# Patient Record
Sex: FEMALE | Race: WHITE | NOT HISPANIC OR LATINO | Employment: OTHER | URBAN - METROPOLITAN AREA
[De-identification: names, ages, dates, MRNs, and addresses within clinical notes are randomized per-mention and may not be internally consistent; named-entity substitution may affect disease eponyms.]

---

## 2023-09-14 PROBLEM — J44.9 CHRONIC OBSTRUCTIVE LUNG DISEASE (MULTI): Status: ACTIVE | Noted: 2023-09-14

## 2023-09-14 PROBLEM — S62.109A CLOSED FRACTURE OF CARPAL BONE: Status: ACTIVE | Noted: 2023-09-14

## 2023-09-14 PROBLEM — R53.1 ATTACKS OF WEAKNESS: Status: ACTIVE | Noted: 2023-09-14

## 2023-09-14 PROBLEM — E66.3 OVERWEIGHT: Status: ACTIVE | Noted: 2019-07-24

## 2023-09-14 PROBLEM — M17.9 KNEE OSTEOARTHRITIS: Status: ACTIVE | Noted: 2023-09-14

## 2023-09-14 PROBLEM — Z98.890 HISTORY OF MITRAL VALVE REPAIR: Status: ACTIVE | Noted: 2023-09-14

## 2023-09-14 PROBLEM — I34.0 MITRAL REGURGITATION: Status: ACTIVE | Noted: 2023-09-14

## 2023-09-14 PROBLEM — Z96.659 ARTIFICIAL KNEE JOINT PRESENT: Status: ACTIVE | Noted: 2023-09-14

## 2023-09-14 PROBLEM — R05.3 CHRONIC COUGH: Status: ACTIVE | Noted: 2023-09-14

## 2023-09-14 PROBLEM — D49.2 ABNORMAL SKIN GROWTH: Status: ACTIVE | Noted: 2019-07-16

## 2023-09-14 PROBLEM — R22.9 SKIN MASS: Status: ACTIVE | Noted: 2023-09-14

## 2023-09-14 PROBLEM — M17.12 OSTEOARTHRITIS OF LEFT KNEE: Status: ACTIVE | Noted: 2023-09-14

## 2023-09-14 PROBLEM — D69.2 SENILE PURPURA (CMS-HCC): Status: ACTIVE | Noted: 2023-09-14

## 2023-09-14 PROBLEM — S80.212A ABRASION, LEFT KNEE, INITIAL ENCOUNTER: Status: ACTIVE | Noted: 2023-09-14

## 2023-09-14 PROBLEM — Z77.29 LONG-TERM EXPOSURE INVOLVING BIRD DROPPINGS: Status: ACTIVE | Noted: 2023-09-14

## 2023-09-14 PROBLEM — F32.A DEPRESSION: Status: ACTIVE | Noted: 2023-09-14

## 2023-09-14 PROBLEM — Z96.652 H/O TOTAL KNEE REPLACEMENT, LEFT: Status: ACTIVE | Noted: 2023-09-14

## 2023-09-14 PROBLEM — Z98.890 HISTORY OF KNEE SURGERY: Status: ACTIVE | Noted: 2023-09-14

## 2023-09-14 PROBLEM — R00.1 BRADYCARDIA: Status: ACTIVE | Noted: 2023-09-14

## 2023-09-14 PROBLEM — E78.00 HYPERCHOLESTEROLEMIA: Status: ACTIVE | Noted: 2023-09-14

## 2023-09-14 PROBLEM — M25.362 INSTABILITY OF LEFT KNEE JOINT: Status: ACTIVE | Noted: 2023-09-14

## 2023-09-14 PROBLEM — I48.91 RAPID ATRIAL FIBRILLATION (MULTI): Status: ACTIVE | Noted: 2023-09-14

## 2023-09-14 PROBLEM — D64.9 ANEMIA: Status: ACTIVE | Noted: 2023-09-14

## 2023-09-14 PROBLEM — F41.9 ANXIETY DISORDER: Status: ACTIVE | Noted: 2023-09-14

## 2023-09-14 PROBLEM — T78.40XA HYPERSENSITIVITY REACTION: Status: ACTIVE | Noted: 2023-09-14

## 2023-09-14 PROBLEM — S63.005A: Status: ACTIVE | Noted: 2023-09-14

## 2023-09-14 PROBLEM — R09.02 HYPOXEMIA: Status: ACTIVE | Noted: 2023-09-14

## 2023-09-14 PROBLEM — I10 HYPERTENSION: Status: ACTIVE | Noted: 2023-09-14

## 2023-09-14 PROBLEM — I50.9 CONGESTIVE HEART FAILURE (MULTI): Status: ACTIVE | Noted: 2019-08-16

## 2023-09-14 PROBLEM — R09.02 HYPOXIA: Status: ACTIVE | Noted: 2023-09-14

## 2023-09-14 PROBLEM — J96.11 CHRONIC RESPIRATORY FAILURE WITH HYPOXIA (MULTI): Status: ACTIVE | Noted: 2023-09-14

## 2023-09-14 PROBLEM — R93.89 ABNORMAL CAT SCAN: Status: ACTIVE | Noted: 2023-09-14

## 2023-09-14 PROBLEM — R53.83 FATIGUE: Status: ACTIVE | Noted: 2023-09-14

## 2023-09-14 PROBLEM — I10 BENIGN ESSENTIAL HYPERTENSION: Status: ACTIVE | Noted: 2023-09-14

## 2023-09-14 PROBLEM — D75.89 MACROCYTOSIS WITHOUT ANEMIA: Status: ACTIVE | Noted: 2018-10-30

## 2023-09-14 PROBLEM — N18.30 STAGE 3 CHRONIC KIDNEY DISEASE (MULTI): Status: ACTIVE | Noted: 2023-09-14

## 2023-09-14 PROBLEM — R26.2 INABILITY TO WALK: Status: ACTIVE | Noted: 2023-09-14

## 2023-09-14 PROBLEM — R06.02 SOB (SHORTNESS OF BREATH): Status: ACTIVE | Noted: 2023-09-14

## 2023-09-14 PROBLEM — S63.90XA HAND SPRAIN: Status: ACTIVE | Noted: 2023-09-14

## 2023-09-14 PROBLEM — J84.9 ILD (INTERSTITIAL LUNG DISEASE) (MULTI): Status: ACTIVE | Noted: 2023-09-14

## 2023-09-14 PROBLEM — J90 BILATERAL PLEURAL EFFUSION: Status: ACTIVE | Noted: 2023-09-14

## 2023-09-14 PROBLEM — J14: Status: ACTIVE | Noted: 2023-09-14

## 2023-09-14 PROBLEM — Z98.818 OTHER DENTAL PROCEDURE STATUS: Status: ACTIVE | Noted: 2023-09-14

## 2023-09-14 RX ORDER — DIGOXIN 125 MCG
1 TABLET ORAL 3 TIMES WEEKLY
COMMUNITY
Start: 2021-07-29

## 2023-09-14 RX ORDER — CELECOXIB 200 MG/1
200 CAPSULE ORAL DAILY
COMMUNITY
Start: 2019-09-16 | End: 2023-10-31 | Stop reason: ALTCHOICE

## 2023-09-14 RX ORDER — FUROSEMIDE 20 MG/1
20 TABLET ORAL DAILY
COMMUNITY
Start: 2023-01-04

## 2023-09-14 RX ORDER — WARFARIN SODIUM 5 MG/1
1 TABLET ORAL DAILY
COMMUNITY
Start: 2019-10-21 | End: 2023-10-31 | Stop reason: ALTCHOICE

## 2023-09-14 RX ORDER — FUROSEMIDE 40 MG/1
1 TABLET ORAL DAILY
COMMUNITY
Start: 2015-03-26 | End: 2023-10-31 | Stop reason: ALTCHOICE

## 2023-09-14 RX ORDER — HYDROXYZINE PAMOATE 25 MG/1
25 CAPSULE ORAL 3 TIMES DAILY PRN
COMMUNITY
Start: 2021-05-11 | End: 2023-10-31 | Stop reason: ALTCHOICE

## 2023-09-14 RX ORDER — DIGOXIN 250 MCG
250 TABLET ORAL DAILY
COMMUNITY
End: 2023-10-31 | Stop reason: ALTCHOICE

## 2023-09-14 RX ORDER — METOPROLOL SUCCINATE 50 MG/1
1.5 TABLET, EXTENDED RELEASE ORAL DAILY
COMMUNITY
Start: 2019-10-17 | End: 2023-11-21

## 2023-09-14 RX ORDER — FLUOXETINE HYDROCHLORIDE 20 MG/1
20 CAPSULE ORAL EVERY MORNING
COMMUNITY
End: 2024-01-24

## 2023-09-14 RX ORDER — SPIRONOLACTONE 25 MG/1
TABLET ORAL
COMMUNITY
Start: 2023-01-04

## 2023-09-14 RX ORDER — ACETAMINOPHEN 325 MG/1
325 TABLET ORAL EVERY 4 HOURS PRN
COMMUNITY

## 2023-09-14 RX ORDER — BUPROPION HYDROCHLORIDE 75 MG/1
1 TABLET ORAL DAILY
COMMUNITY
Start: 2014-04-09

## 2023-09-14 RX ORDER — FLUOXETINE HYDROCHLORIDE 40 MG/1
1 CAPSULE ORAL DAILY
COMMUNITY
Start: 2014-09-30 | End: 2023-10-31 | Stop reason: ALTCHOICE

## 2023-09-14 RX ORDER — ATORVASTATIN CALCIUM 20 MG/1
20 TABLET, FILM COATED ORAL DAILY
COMMUNITY
End: 2024-02-09 | Stop reason: SDUPTHER

## 2023-09-14 RX ORDER — WARFARIN 2.5 MG/1
2.5 TABLET ORAL
COMMUNITY
Start: 2023-01-04 | End: 2023-10-31 | Stop reason: ALTCHOICE

## 2023-10-04 ENCOUNTER — OFFICE VISIT (OUTPATIENT)
Dept: CARDIOLOGY | Facility: CLINIC | Age: 83
End: 2023-10-04
Payer: MEDICARE

## 2023-10-04 VITALS
HEART RATE: 105 BPM | DIASTOLIC BLOOD PRESSURE: 70 MMHG | SYSTOLIC BLOOD PRESSURE: 123 MMHG | OXYGEN SATURATION: 94 % | BODY MASS INDEX: 24.29 KG/M2 | HEIGHT: 62 IN | WEIGHT: 132 LBS

## 2023-10-04 DIAGNOSIS — I48.11 LONGSTANDING PERSISTENT ATRIAL FIBRILLATION (MULTI): Primary | ICD-10-CM

## 2023-10-04 PROCEDURE — 3078F DIAST BP <80 MM HG: CPT | Performed by: INTERNAL MEDICINE

## 2023-10-04 PROCEDURE — 1036F TOBACCO NON-USER: CPT | Performed by: INTERNAL MEDICINE

## 2023-10-04 PROCEDURE — 99204 OFFICE O/P NEW MOD 45 MIN: CPT | Performed by: INTERNAL MEDICINE

## 2023-10-04 PROCEDURE — 3074F SYST BP LT 130 MM HG: CPT | Performed by: INTERNAL MEDICINE

## 2023-10-04 PROCEDURE — 1159F MED LIST DOCD IN RCRD: CPT | Performed by: INTERNAL MEDICINE

## 2023-10-04 PROCEDURE — 99214 OFFICE O/P EST MOD 30 MIN: CPT | Performed by: INTERNAL MEDICINE

## 2023-10-04 ASSESSMENT — PATIENT HEALTH QUESTIONNAIRE - PHQ9
2. FEELING DOWN, DEPRESSED OR HOPELESS: NOT AT ALL
1. LITTLE INTEREST OR PLEASURE IN DOING THINGS: NOT AT ALL
SUM OF ALL RESPONSES TO PHQ9 QUESTIONS 1 AND 2: 0

## 2023-10-04 NOTE — LETTER
October 5, 2023     Andrés Serna MD  [address] [facility] [address]  [facility] [address]    Patient: Vilma Bernabe   YOB: 1940   Date of Visit: 10/4/2023       Dear Dr. Andrés Serna MD:    Thank you for referring Vilma Bernabe to me for evaluation. Below are my notes for this consultation.  If you have questions, please do not hesitate to call me. I look forward to following your patient along with you.       Sincerely,     Dilan Holt MD      CC: No Recipients  ______________________________________________________________________________________      Cardio: Dr. Serna    I was asked by Dr. Serna to evaluate this patient in consultation for evaluation of left atrial appendage closure.    The patient is a 82-year-old lady with past medical history of hypertension, heart failure (EF 40%), hypertension, history of mitral valve repair/tricuspid valve repair along with maze procedure 2015.      The patient has persistent atrial fibrillation.  She have previously had made procedure in 2015, it is unclear if she had left atrial appendage clip performed at that time.  The patient has been on anticoagulation with warfarin.    In 2021 patient had an episode in which she fainted and had a fall leading to significant facial trauma requiring multiple stitches.  Fortunately she did not have any intracranial bleeding.  After discussing the risk and benefit, final decision was made to resume her warfarin.  In addition she also has labile INR, which concerns her and her primary cardiology team regarding either risk of increased bleeding or ineffective stroke prevention.  The patient also reports easy bruisability.      Given the patient's significant fall with facial trauma, labile INR and easy bruisability,  the patient is referred for consideration of left atrial appendage closure for stroke risk reduction.       ROS:  Constitutional: not feeling tired, not feeling poorly, no fever and  no chills.   Eyes: no eyesight problems, no blurred vision, no diplopia, no eye pain, no purulent discharge from the eyes, eyes not red, no dryness of the eyes and no itching of the eyes.   ENT: no nosebleeds, no hearing loss, no tinnitus, no earache, no sore throat, no hoarseness, no swollen glands in the neck and no nasal discharge.   Cardiovascular: no intermittent leg claudication, no chest pain, no tightness or heavy pressure, no shortness of breath, no palpitations, no lower extremity edema, the heart rate was not slow, the heart rate was not fast and as noted in HPI.   Respiratory: no chronic cough, not coughing up sputum,  no wheezing that is consistent with asthma, no asthma, no orthopnea and no postural nocturnal dyspnea.   Gastrointestinal: no change in bowel habits, no blood in stools, no diarrhea, no constipation, no nausea, no vomiting, no abdominal pain, no signs and symptoms of ulcer disease, no frandy colored stools and no intolerance to fatty foods.   Genitourinary: no hematuria,  no urinary frequency, no dysuria, no incontinence, no burning sensation during urination, no urinary hesitancy, no nocturia, no genital lesion, no testicular pain, urinary stream is not smaller and urinary stream does not start and stop.   Musculoskeletal: no arthralgias, no myalgias, no joint swelling, no joint stiffness, no muscle weakness, no back pain, no limb pain, no limb swelling and no difficulty walking.   Skin: no skin rashes, no change in skin color and pigmentation, no skin lesions and no skin lumps.   Neurological: no seizures, no frequent falls, no headaches, no dizziness, no tingling, no fainting and no limb weakness.   Psychiatric: no depression, not suicidal, no confusion, no memory lapses or loss, no anxiety, no personality change and no emotional problems.   Endocrine: no goiter, no thyroid disorder, no diabetes mellitus, no excessive thirst, no dry skin, no cold intolerance, no heat intolerance, no  "erectile dysfunction, no increased urinary frequency, no proptosis and no deepening of the voice.   Hematologic/Lymphatic: no bleeding issues.   All other systems have been reviewed and are negative for complaint.     Physical Exam:     Visit Vitals  /70 (BP Location: Left arm, Patient Position: Sitting, BP Cuff Size: Adult)   Pulse 105   Ht 1.575 m (5' 2\")   Wt 59.9 kg (132 lb)   SpO2 94%   BMI 24.14 kg/m²   Smoking Status Never   BSA 1.62 m²        Constitutional: alert and in no acute distress.   Eyes: no erythema, swelling or discharge from the eye .   Ears, Nose, Mouth, and Throat: external inspection of ears and nose is normal , lips, teeth, and gums are normal with good dentition  and oropharynx normal with no erythema, edema, exudate or lesions .   Neck: neck is supple, symmetric, trachea midline, no masses  and no thyromegaly .   Pulmonary: no increased work of breathing or signs of respiratory distress , lungs clear to auscultation. , normal percussion of chest  and chest palpation normal .   Cardiovascular: RRR, no murmur,  no leg edema  Abdomen: abdomen non-tender, no masses  and no hepatomegaly .           Skin:  no skin lesions          Neurologic: non-focal neurologic examination.      Psychiatric judgment and insight is normal , oriented to person, place and time , normal mood and affect .       Labs:    Results for orders placed or performed in visit on 08/29/23    -  PT/INR (Data Conversion)   Result Value Ref Range     - Protime Result (Data Conversion) 18.0 8.9 - 12.1 sec     - Lot # (Data Conversion) [id number]      - INR Result (Data Conversion) 1.5 0.86 - 1.16    LH - Time of Test (Data Conversion) 0232pm      - Initials (Data Conversion) dmw     LH - Expiration Date (Data Conversion) 48728005     LH - Date/Time Physician notified (Data Conversion) [id number]      - Received Date (Data Conversion) [id number]           Medications:    Current Outpatient Medications "   Medication Instructions   • acetaminophen (TYLENOL) 325 mg, oral, Every 4 hours PRN   • apixaban (ELIQUIS) 2.5 mg, oral, 2 times daily   • atorvastatin (LIPITOR) 20 mg, oral, Daily   • buPROPion (Wellbutrin) 75 mg tablet 1 tablet, oral, Daily   • celecoxib (CELEBREX) 200 mg, oral, Daily   • digoxin (Lanoxin) 125 MCG tablet 1 tablet, oral, Daily   • digoxin (LANOXIN) 250 mcg, oral, Daily   • empagliflozin (JARDIANCE) 10 mg, oral, Daily   • FLUoxetine (PROzac) 40 mg capsule 1 capsule, oral, Daily   • FLUoxetine (PROZAC) 20 mg, oral, Every morning   • furosemide (Lasix) 40 mg tablet 1 tablet, oral, Daily   • furosemide (LASIX) 20 mg, oral, Daily   • hydrOXYzine pamoate (VISTARIL) 25 mg, oral, 3 times daily PRN   • metoprolol succinate XL (Toprol-XL) 50 mg 24 hr tablet 1 tablet, oral, Daily   • multivitamin with minerals (HAIR,SKIN AND NAILS ORAL) as directed Orally Daily   • spironolactone (Aldactone) 25 mg tablet  1/2 tablet Orally    • warfarin (Coumadin) 5 mg tablet 1 tablet, oral, Daily   • warfarin (COUMADIN) 2.5 mg, oral, Once a day          Assessment:      This is a 82-year-old lady with history of heart failure (EF 40%), mitral valve repair, tricuspid valve repair, persistent atrial fibrillation with history of maze in 2015.  The patient comes to our clinic because of history of a significant fall leading to facial trauma, labile INR and easy bruisability.  We agree with her primary cardiology team that considering her significant fall leading to facial trauma and ineffective protection from anticoagulation/risk of bleeding because of variability in her INR; she is not a good candidate for long-term anticoagulation.      The CHADS-VASC score is 4 and HAS-BLED score is 3. The patient is at increased risk of both bleeding and stroke.  As such the patient is a reasonable candidate for consideration of left atrial appendage occluder placement.    She had mitral/tricuspid valve repair and maze procedure performed  in 2015 at [facility].  We do not have that report available.  It is possible that during the procedure left atrial appendage was ligated.  In order to further investigate that we will try to obtain that report, in addition we will get a CT scan to look for possible left atrial appendage ligation and also for any residual left atrial appendage left behind.  Any further decision to proceed with left atrial appendage closure will depend upon that information.    Today we discussed the left atrial appendage closure procedure. The patient was given written educational handout materials and watched an educational video. All risks, benefits and alternative were discussed.     The risks discussed included but were not limited to vascular complications, sedation related complications, risk of MI, CVA, device embolization, pericardial tamponade and death. The patient verbalized understanding and decided to proceed.        Plan will be for preprocedural cardiac CT. Following device implant, strategy will be for dual antiplatelet therapy with aspirin and clopidogrel for 6 months then aspirin for life.     Thank you, Dr. Serna, for this consultation and for allowing me to participate in the care of this patient.

## 2023-10-04 NOTE — PROGRESS NOTES
Cardio: Dr. Kareem PATTON was asked by Dr. Serna to evaluate this patient in consultation for evaluation of left atrial appendage closure.    The patient is a 82-year-old lady with past medical history of hypertension, heart failure (EF 40%), hypertension, history of mitral valve repair/tricuspid valve repair along with maze procedure 2015.      The patient has persistent atrial fibrillation.  She have previously had made procedure in 2015, it is unclear if she had left atrial appendage clip performed at that time.  The patient has been on anticoagulation with warfarin.    In 2021 patient had an episode in which she fainted and had a fall leading to significant facial trauma requiring multiple stitches.  Fortunately she did not have any intracranial bleeding.  After discussing the risk and benefit, final decision was made to resume her warfarin.  In addition she also has labile INR, which concerns her and her primary cardiology team regarding either risk of increased bleeding or ineffective stroke prevention.  The patient also reports easy bruisability.      Given the patient's significant fall with facial trauma, labile INR and easy bruisability,  the patient is referred for consideration of left atrial appendage closure for stroke risk reduction.       ROS:  Constitutional: not feeling tired, not feeling poorly, no fever and no chills.   Eyes: no eyesight problems, no blurred vision, no diplopia, no eye pain, no purulent discharge from the eyes, eyes not red, no dryness of the eyes and no itching of the eyes.   ENT: no nosebleeds, no hearing loss, no tinnitus, no earache, no sore throat, no hoarseness, no swollen glands in the neck and no nasal discharge.   Cardiovascular: no intermittent leg claudication, no chest pain, no tightness or heavy pressure, no shortness of breath, no palpitations, no lower extremity edema, the heart rate was not slow, the heart rate was not fast and as noted in HPI.   Respiratory:  "no chronic cough, not coughing up sputum,  no wheezing that is consistent with asthma, no asthma, no orthopnea and no postural nocturnal dyspnea.   Gastrointestinal: no change in bowel habits, no blood in stools, no diarrhea, no constipation, no nausea, no vomiting, no abdominal pain, no signs and symptoms of ulcer disease, no frandy colored stools and no intolerance to fatty foods.   Genitourinary: no hematuria,  no urinary frequency, no dysuria, no incontinence, no burning sensation during urination, no urinary hesitancy, no nocturia, no genital lesion, no testicular pain, urinary stream is not smaller and urinary stream does not start and stop.   Musculoskeletal: no arthralgias, no myalgias, no joint swelling, no joint stiffness, no muscle weakness, no back pain, no limb pain, no limb swelling and no difficulty walking.   Skin: no skin rashes, no change in skin color and pigmentation, no skin lesions and no skin lumps.   Neurological: no seizures, no frequent falls, no headaches, no dizziness, no tingling, no fainting and no limb weakness.   Psychiatric: no depression, not suicidal, no confusion, no memory lapses or loss, no anxiety, no personality change and no emotional problems.   Endocrine: no goiter, no thyroid disorder, no diabetes mellitus, no excessive thirst, no dry skin, no cold intolerance, no heat intolerance, no erectile dysfunction, no increased urinary frequency, no proptosis and no deepening of the voice.   Hematologic/Lymphatic: no bleeding issues.   All other systems have been reviewed and are negative for complaint.     Physical Exam:     Visit Vitals  /70 (BP Location: Left arm, Patient Position: Sitting, BP Cuff Size: Adult)   Pulse 105   Ht 1.575 m (5' 2\")   Wt 59.9 kg (132 lb)   SpO2 94%   BMI 24.14 kg/m²   Smoking Status Never   BSA 1.62 m²        Constitutional: alert and in no acute distress.   Eyes: no erythema, swelling or discharge from the eye .   Ears, Nose, Mouth, and Throat: " external inspection of ears and nose is normal , lips, teeth, and gums are normal with good dentition  and oropharynx normal with no erythema, edema, exudate or lesions .   Neck: neck is supple, symmetric, trachea midline, no masses  and no thyromegaly .   Pulmonary: no increased work of breathing or signs of respiratory distress , lungs clear to auscultation. , normal percussion of chest  and chest palpation normal .   Cardiovascular: RRR, no murmur,  no leg edema  Abdomen: abdomen non-tender, no masses  and no hepatomegaly .           Skin:  no skin lesions          Neurologic: non-focal neurologic examination.      Psychiatric judgment and insight is normal , oriented to person, place and time , normal mood and affect .       Labs:    Results for orders placed or performed in visit on 08/29/23    -  PT/INR (Data Conversion)   Result Value Ref Range     - Protime Result (Data Conversion) 18.0 8.9 - 12.1 sec     - Lot # (Data Conversion) [id number]      - INR Result (Data Conversion) 1.5 0.86 - 1.16     - Time of Test (Data Conversion) 0232pm      - Initials (Data Conversion) dmw      - Expiration Date (Data Conversion) [id number]      - Date/Time Physician notified (Data Conversion) [id number]      - Received Date (Data Conversion) [id number]           Medications:    Current Outpatient Medications   Medication Instructions    acetaminophen (TYLENOL) 325 mg, oral, Every 4 hours PRN    apixaban (ELIQUIS) 2.5 mg, oral, 2 times daily    atorvastatin (LIPITOR) 20 mg, oral, Daily    buPROPion (Wellbutrin) 75 mg tablet 1 tablet, oral, Daily    celecoxib (CELEBREX) 200 mg, oral, Daily    digoxin (Lanoxin) 125 MCG tablet 1 tablet, oral, Daily    digoxin (LANOXIN) 250 mcg, oral, Daily    empagliflozin (JARDIANCE) 10 mg, oral, Daily    FLUoxetine (PROzac) 40 mg capsule 1 capsule, oral, Daily    FLUoxetine (PROZAC) 20 mg, oral, Every morning    furosemide (Lasix) 40 mg tablet 1 tablet, oral, Daily     furosemide (LASIX) 20 mg, oral, Daily    hydrOXYzine pamoate (VISTARIL) 25 mg, oral, 3 times daily PRN    metoprolol succinate XL (Toprol-XL) 50 mg 24 hr tablet 1 tablet, oral, Daily    multivitamin with minerals (HAIR,SKIN AND NAILS ORAL) as directed Orally Daily    spironolactone (Aldactone) 25 mg tablet  1/2 tablet Orally     warfarin (Coumadin) 5 mg tablet 1 tablet, oral, Daily    warfarin (COUMADIN) 2.5 mg, oral, Once a day          Assessment:      This is a 82-year-old lady with history of heart failure (EF 40%), mitral valve repair, tricuspid valve repair, persistent atrial fibrillation with history of maze in 2015.  The patient comes to our clinic because of history of a significant fall leading to facial trauma, labile INR and easy bruisability.  We agree with her primary cardiology team that considering her significant fall leading to facial trauma and ineffective protection from anticoagulation/risk of bleeding because of variability in her INR; she is not a good candidate for long-term anticoagulation.      The CHADS-VASC score is 4 and HAS-BLED score is 3. The patient is at increased risk of both bleeding and stroke.  As such the patient is a reasonable candidate for consideration of left atrial appendage occluder placement.    She had mitral/tricuspid valve repair and maze procedure performed in 2015 at [facility].  We do not have that report available.  It is possible that during the procedure left atrial appendage was ligated.  In order to further investigate that we will try to obtain that report, in addition we will get a CT scan to look for possible left atrial appendage ligation and also for any residual left atrial appendage left behind.  Any further decision to proceed with left atrial appendage closure will depend upon that information.    Today we discussed the left atrial appendage closure procedure. The patient was given written educational handout materials and watched an educational  video. All risks, benefits and alternative were discussed.     The risks discussed included but were not limited to vascular complications, sedation related complications, risk of MI, CVA, device embolization, pericardial tamponade and death. The patient verbalized understanding and decided to proceed.        Plan will be for preprocedural cardiac CT. Following device implant, strategy will be for dual antiplatelet therapy with aspirin and clopidogrel for 6 months then aspirin for life.     Thank you, Dr. Serna, for this consultation and for allowing me to participate in the care of this patient.

## 2023-10-05 ENCOUNTER — TELEPHONE (OUTPATIENT)
Dept: CARDIOLOGY | Facility: HOSPITAL | Age: 83
End: 2023-10-05
Payer: MEDICARE

## 2023-10-05 DIAGNOSIS — I48.91 ATRIAL FIBRILLATION, UNSPECIFIED TYPE (MULTI): ICD-10-CM

## 2023-10-31 ENCOUNTER — OFFICE VISIT (OUTPATIENT)
Dept: PRIMARY CARE | Facility: CLINIC | Age: 83
End: 2023-10-31
Payer: MEDICARE

## 2023-10-31 VITALS
HEART RATE: 78 BPM | BODY MASS INDEX: 24.29 KG/M2 | OXYGEN SATURATION: 97 % | HEIGHT: 62 IN | WEIGHT: 132 LBS | DIASTOLIC BLOOD PRESSURE: 80 MMHG | SYSTOLIC BLOOD PRESSURE: 124 MMHG

## 2023-10-31 DIAGNOSIS — I48.91 RAPID ATRIAL FIBRILLATION (MULTI): Primary | ICD-10-CM

## 2023-10-31 PROCEDURE — 99213 OFFICE O/P EST LOW 20 MIN: CPT | Performed by: FAMILY MEDICINE

## 2023-10-31 PROCEDURE — 3079F DIAST BP 80-89 MM HG: CPT | Performed by: FAMILY MEDICINE

## 2023-10-31 PROCEDURE — 1126F AMNT PAIN NOTED NONE PRSNT: CPT | Performed by: FAMILY MEDICINE

## 2023-10-31 PROCEDURE — 1159F MED LIST DOCD IN RCRD: CPT | Performed by: FAMILY MEDICINE

## 2023-10-31 PROCEDURE — 3074F SYST BP LT 130 MM HG: CPT | Performed by: FAMILY MEDICINE

## 2023-10-31 PROCEDURE — 1036F TOBACCO NON-USER: CPT | Performed by: FAMILY MEDICINE

## 2023-10-31 ASSESSMENT — PATIENT HEALTH QUESTIONNAIRE - PHQ9
SUM OF ALL RESPONSES TO PHQ9 QUESTIONS 1 AND 2: 0
2. FEELING DOWN, DEPRESSED OR HOPELESS: NOT AT ALL
1. LITTLE INTEREST OR PLEASURE IN DOING THINGS: NOT AT ALL

## 2023-10-31 ASSESSMENT — PAIN SCALES - GENERAL: PAINLEVEL: 0-NO PAIN

## 2023-10-31 ASSESSMENT — ENCOUNTER SYMPTOMS: BRUISES/BLEEDS EASILY: 0

## 2023-10-31 NOTE — PROGRESS NOTES
"Subjective   Patient ID: Vilma Bernabe is a 82 y.o. female who presents for inr.    Pt here for follow up.     Atrial fib- we switched to eliquis last visit per patient request due to inr variability.  She is undergoing evaluation for watchmans procedure.  Denies any bleeding or bruising.  No chest pain or SOB.           Review of Systems   Hematological:  Does not bruise/bleed easily.       Objective   Ht 1.575 m (5' 2\")   Wt 59.9 kg (132 lb)   BMI 24.14 kg/m²     Physical Exam  Constitutional:       Appearance: Normal appearance.   Cardiovascular:      Rate and Rhythm: Rhythm irregular.         Assessment/Plan   Diagnoses and all orders for this visit:  Rapid atrial fibrillation (CMS/HCC)  Continue with eliquis, agree with watchman.    Labs ordered today by cardiology.           "

## 2023-11-09 ENCOUNTER — TELEPHONE (OUTPATIENT)
Dept: CARDIOLOGY | Facility: HOSPITAL | Age: 83
End: 2023-11-09

## 2023-11-09 ENCOUNTER — LAB (OUTPATIENT)
Dept: LAB | Facility: LAB | Age: 83
End: 2023-11-09
Payer: MEDICARE

## 2023-11-09 DIAGNOSIS — I48.91 ATRIAL FIBRILLATION, UNSPECIFIED TYPE (MULTI): ICD-10-CM

## 2023-11-09 DIAGNOSIS — I10 ESSENTIAL (PRIMARY) HYPERTENSION: Primary | ICD-10-CM

## 2023-11-09 LAB
ANION GAP SERPL CALC-SCNC: 11 MMOL/L
BUN SERPL-MCNC: 17 MG/DL (ref 8–25)
CALCIUM SERPL-MCNC: 9.4 MG/DL (ref 8.5–10.4)
CHLORIDE SERPL-SCNC: 102 MMOL/L (ref 97–107)
CO2 SERPL-SCNC: 29 MMOL/L (ref 24–31)
CREAT SERPL-MCNC: 1.2 MG/DL (ref 0.4–1.6)
GFR SERPL CREATININE-BSD FRML MDRD: 45 ML/MIN/1.73M*2
GLUCOSE SERPL-MCNC: 98 MG/DL (ref 65–99)
MAGNESIUM SERPL-MCNC: 2.4 MG/DL (ref 1.6–3.1)
POTASSIUM SERPL-SCNC: 4.5 MMOL/L (ref 3.4–5.1)
SODIUM SERPL-SCNC: 142 MMOL/L (ref 133–145)

## 2023-11-09 PROCEDURE — 36415 COLL VENOUS BLD VENIPUNCTURE: CPT

## 2023-11-09 PROCEDURE — 83735 ASSAY OF MAGNESIUM: CPT

## 2023-11-09 PROCEDURE — 80048 BASIC METABOLIC PNL TOTAL CA: CPT

## 2023-11-10 ENCOUNTER — APPOINTMENT (OUTPATIENT)
Dept: RADIOLOGY | Facility: HOSPITAL | Age: 83
End: 2023-11-10
Payer: MEDICARE

## 2023-11-17 ENCOUNTER — TELEPHONE (OUTPATIENT)
Dept: CARDIOLOGY | Facility: HOSPITAL | Age: 83
End: 2023-11-17
Payer: MEDICARE

## 2023-11-20 DIAGNOSIS — I10 BENIGN ESSENTIAL HYPERTENSION: Primary | ICD-10-CM

## 2023-11-21 RX ORDER — METOPROLOL SUCCINATE 50 MG/1
50 TABLET, EXTENDED RELEASE ORAL DAILY
Qty: 90 TABLET | Refills: 3 | Status: SHIPPED | OUTPATIENT
Start: 2023-11-21

## 2023-11-24 ENCOUNTER — HOSPITAL ENCOUNTER (OUTPATIENT)
Dept: RADIOLOGY | Facility: HOSPITAL | Age: 83
Discharge: HOME | End: 2023-11-24
Payer: MEDICARE

## 2023-11-24 DIAGNOSIS — I48.91 ATRIAL FIBRILLATION, UNSPECIFIED TYPE (MULTI): ICD-10-CM

## 2023-11-24 PROCEDURE — 75572 CT HRT W/3D IMAGE: CPT

## 2023-11-24 PROCEDURE — 2550000001 HC RX 255 CONTRASTS: Performed by: INTERNAL MEDICINE

## 2023-11-24 RX ADMIN — IOHEXOL 75 ML: 350 INJECTION, SOLUTION INTRAVENOUS at 15:14

## 2023-11-30 ENCOUNTER — APPOINTMENT (OUTPATIENT)
Dept: ORTHOPEDIC SURGERY | Facility: CLINIC | Age: 83
End: 2023-11-30
Payer: MEDICARE

## 2023-12-06 ENCOUNTER — APPOINTMENT (OUTPATIENT)
Dept: ORTHOPEDIC SURGERY | Facility: CLINIC | Age: 83
End: 2023-12-06
Payer: MEDICARE

## 2023-12-29 ENCOUNTER — TELEPHONE (OUTPATIENT)
Dept: PRIMARY CARE | Facility: CLINIC | Age: 83
End: 2023-12-29
Payer: MEDICARE

## 2023-12-29 NOTE — TELEPHONE ENCOUNTER
Patient wants to know if she can make another appointment for you to remove small growths on her face?

## 2024-01-19 ENCOUNTER — APPOINTMENT (OUTPATIENT)
Dept: PRIMARY CARE | Facility: CLINIC | Age: 84
End: 2024-01-19
Payer: MEDICARE

## 2024-01-24 DIAGNOSIS — F41.9 ANXIETY DISORDER, UNSPECIFIED TYPE: Primary | ICD-10-CM

## 2024-01-24 RX ORDER — FLUOXETINE HYDROCHLORIDE 20 MG/1
20 CAPSULE ORAL
Qty: 90 CAPSULE | Refills: 3 | Status: SHIPPED | OUTPATIENT
Start: 2024-01-24

## 2024-01-25 ENCOUNTER — APPOINTMENT (OUTPATIENT)
Dept: PRIMARY CARE | Facility: CLINIC | Age: 84
End: 2024-01-25
Payer: MEDICARE

## 2024-02-02 ENCOUNTER — APPOINTMENT (OUTPATIENT)
Dept: PRIMARY CARE | Facility: CLINIC | Age: 84
End: 2024-02-02
Payer: MEDICARE

## 2024-02-08 ENCOUNTER — APPOINTMENT (OUTPATIENT)
Dept: PRIMARY CARE | Facility: CLINIC | Age: 84
End: 2024-02-08
Payer: MEDICARE

## 2024-02-09 DIAGNOSIS — I50.9 CONGESTIVE HEART FAILURE, UNSPECIFIED HF CHRONICITY, UNSPECIFIED HEART FAILURE TYPE (MULTI): Primary | ICD-10-CM

## 2024-02-11 RX ORDER — ATORVASTATIN CALCIUM 20 MG/1
20 TABLET, FILM COATED ORAL DAILY
Qty: 90 TABLET | Refills: 3 | Status: SHIPPED | OUTPATIENT
Start: 2024-02-11 | End: 2024-02-12

## 2024-02-12 DIAGNOSIS — I50.9 CONGESTIVE HEART FAILURE, UNSPECIFIED HF CHRONICITY, UNSPECIFIED HEART FAILURE TYPE (MULTI): ICD-10-CM

## 2024-02-12 RX ORDER — ATORVASTATIN CALCIUM 20 MG/1
20 TABLET, FILM COATED ORAL DAILY
Qty: 90 TABLET | Refills: 1 | Status: SHIPPED | OUTPATIENT
Start: 2024-02-12 | End: 2024-05-13 | Stop reason: SDUPTHER

## 2024-03-11 ENCOUNTER — OFFICE VISIT (OUTPATIENT)
Dept: PRIMARY CARE | Facility: CLINIC | Age: 84
End: 2024-03-11
Payer: MEDICARE

## 2024-03-11 VITALS
WEIGHT: 132 LBS | SYSTOLIC BLOOD PRESSURE: 122 MMHG | DIASTOLIC BLOOD PRESSURE: 80 MMHG | BODY MASS INDEX: 24.29 KG/M2 | OXYGEN SATURATION: 98 % | HEART RATE: 78 BPM | HEIGHT: 62 IN

## 2024-03-11 DIAGNOSIS — L98.9 SKIN LESION: Primary | ICD-10-CM

## 2024-03-11 PROCEDURE — 1036F TOBACCO NON-USER: CPT | Performed by: FAMILY MEDICINE

## 2024-03-11 PROCEDURE — 3079F DIAST BP 80-89 MM HG: CPT | Performed by: FAMILY MEDICINE

## 2024-03-11 PROCEDURE — 1126F AMNT PAIN NOTED NONE PRSNT: CPT | Performed by: FAMILY MEDICINE

## 2024-03-11 PROCEDURE — 1159F MED LIST DOCD IN RCRD: CPT | Performed by: FAMILY MEDICINE

## 2024-03-11 PROCEDURE — 11200 RMVL SKIN TAGS UP TO&INC 15: CPT | Performed by: FAMILY MEDICINE

## 2024-03-11 PROCEDURE — 3074F SYST BP LT 130 MM HG: CPT | Performed by: FAMILY MEDICINE

## 2024-03-11 ASSESSMENT — PAIN SCALES - GENERAL: PAINLEVEL: 0-NO PAIN

## 2024-03-11 NOTE — PROGRESS NOTES
Patient ID: Vilma Bernabe is a 83 y.o. female.    Destruction of lesion    Date/Time: 3/11/2024 4:26 PM    Performed by: Gee Ramsey MD  Authorized by: Gee Ramsey MD    Number of Lesions: 6    Comments:  Seborrheic keratosis and solar lentigos scalp, left cheek.  Cryotherapy.  2 freeze thaw cycles.  Liquid nitrogen.  Tolerated well.

## 2024-04-01 ENCOUNTER — APPOINTMENT (OUTPATIENT)
Dept: ORTHOPEDIC SURGERY | Facility: CLINIC | Age: 84
End: 2024-04-01
Payer: MEDICARE
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
## 2024-04-02 PROBLEM — I10 ESSENTIAL HYPERTENSION: Status: ACTIVE | Noted: 2024-04-02

## 2024-04-02 PROBLEM — G43.009 MIGRAINE WITHOUT AURA: Status: ACTIVE | Noted: 2024-04-02

## 2024-04-02 PROBLEM — I42.9 CARDIOMYOPATHY (MULTI): Status: ACTIVE | Noted: 2024-04-02

## 2024-04-02 PROBLEM — I71.20 THORACIC AORTIC ANEURYSM WITHOUT RUPTURE (CMS-HCC): Status: ACTIVE | Noted: 2024-04-02

## 2024-04-05 ENCOUNTER — OFFICE VISIT (OUTPATIENT)
Dept: ORTHOPEDIC SURGERY | Facility: CLINIC | Age: 84
End: 2024-04-05
Payer: MEDICARE

## 2024-04-05 ENCOUNTER — APPOINTMENT (OUTPATIENT)
Dept: ORTHOPEDIC SURGERY | Facility: CLINIC | Age: 84
End: 2024-04-05
Payer: MEDICARE

## 2024-04-05 ENCOUNTER — HOSPITAL ENCOUNTER (OUTPATIENT)
Dept: RADIOLOGY | Facility: CLINIC | Age: 84
Discharge: HOME | End: 2024-04-05
Payer: MEDICARE

## 2024-04-05 VITALS — BODY MASS INDEX: 24.15 KG/M2 | WEIGHT: 132.06 LBS

## 2024-04-05 DIAGNOSIS — M25.361 INSTABILITY OF RIGHT KNEE JOINT: ICD-10-CM

## 2024-04-05 DIAGNOSIS — M25.561 RIGHT KNEE PAIN, UNSPECIFIED CHRONICITY: ICD-10-CM

## 2024-04-05 DIAGNOSIS — Z96.652 H/O TOTAL KNEE REPLACEMENT, LEFT: Primary | ICD-10-CM

## 2024-04-05 DIAGNOSIS — M17.11 PRIMARY OSTEOARTHRITIS OF RIGHT KNEE: ICD-10-CM

## 2024-04-05 DIAGNOSIS — M25.569 KNEE PAIN: ICD-10-CM

## 2024-04-05 PROCEDURE — 2500000004 HC RX 250 GENERAL PHARMACY W/ HCPCS (ALT 636 FOR OP/ED): Performed by: ORTHOPAEDIC SURGERY

## 2024-04-05 PROCEDURE — 1159F MED LIST DOCD IN RCRD: CPT | Performed by: ORTHOPAEDIC SURGERY

## 2024-04-05 PROCEDURE — 99213 OFFICE O/P EST LOW 20 MIN: CPT | Performed by: ORTHOPAEDIC SURGERY

## 2024-04-05 PROCEDURE — 1036F TOBACCO NON-USER: CPT | Performed by: ORTHOPAEDIC SURGERY

## 2024-04-05 PROCEDURE — 1160F RVW MEDS BY RX/DR IN RCRD: CPT | Performed by: ORTHOPAEDIC SURGERY

## 2024-04-05 PROCEDURE — 20610 DRAIN/INJ JOINT/BURSA W/O US: CPT | Performed by: ORTHOPAEDIC SURGERY

## 2024-04-05 PROCEDURE — 73560 X-RAY EXAM OF KNEE 1 OR 2: CPT | Mod: RT

## 2024-04-05 PROCEDURE — 2500000005 HC RX 250 GENERAL PHARMACY W/O HCPCS: Performed by: ORTHOPAEDIC SURGERY

## 2024-04-05 RX ORDER — AMOXICILLIN 500 MG/1
CAPSULE ORAL
COMMUNITY
Start: 2023-11-28

## 2024-04-05 RX ORDER — LIDOCAINE HYDROCHLORIDE 10 MG/ML
1 INJECTION INFILTRATION; PERINEURAL
Status: COMPLETED | OUTPATIENT
Start: 2024-04-05 | End: 2024-04-05

## 2024-04-05 RX ORDER — TRIAMCINOLONE ACETONIDE 40 MG/ML
40 INJECTION, SUSPENSION INTRA-ARTICULAR; INTRAMUSCULAR
Status: COMPLETED | OUTPATIENT
Start: 2024-04-05 | End: 2024-04-05

## 2024-04-05 RX ADMIN — LIDOCAINE HYDROCHLORIDE 1 ML: 10 INJECTION, SOLUTION INFILTRATION; PERINEURAL at 13:09

## 2024-04-05 RX ADMIN — TRIAMCINOLONE ACETONIDE 40 MG: 400 INJECTION, SUSPENSION INTRA-ARTICULAR; INTRAMUSCULAR at 13:09

## 2024-04-05 ASSESSMENT — PAIN DESCRIPTION - DESCRIPTORS: DESCRIPTORS: ACHING

## 2024-04-05 ASSESSMENT — PAIN - FUNCTIONAL ASSESSMENT: PAIN_FUNCTIONAL_ASSESSMENT: 0-10

## 2024-04-05 ASSESSMENT — ENCOUNTER SYMPTOMS
LOSS OF SENSATION IN FEET: 0
DEPRESSION: 0
OCCASIONAL FEELINGS OF UNSTEADINESS: 0

## 2024-04-05 ASSESSMENT — PAIN SCALES - GENERAL: PAINLEVEL_OUTOF10: 5 - MODERATE PAIN

## 2024-04-05 ASSESSMENT — PATIENT HEALTH QUESTIONNAIRE - PHQ9
SUM OF ALL RESPONSES TO PHQ9 QUESTIONS 1 AND 2: 0
1. LITTLE INTEREST OR PLEASURE IN DOING THINGS: NOT AT ALL
2. FEELING DOWN, DEPRESSED OR HOPELESS: NOT AT ALL

## 2024-04-05 ASSESSMENT — LIFESTYLE VARIABLES: TOTAL SCORE: 0

## 2024-04-05 NOTE — PROGRESS NOTES
Subjective      Chief Complaint   Patient presents with    Right Knee - Pain        No surgery found     HPI  This 83 year old patient presents today with  right knee pain. The patient states that this right knee pain has been present for years but has worsened after a fall. The patient rates the knee pain as 8. The patient states that knee pain is worse with and aggravated by activity and bearing weight. The patient states the knee is giving way and locking.The patient has tried ibuprofen and tylenol with no relief.  Last history is significant in that she is status post left total knee replacement and has absolutely no left knee pain or instability.  The patient requests a discussion of further treatment options on examination today.     CARDIOLOGY:   Negative for chest pain, shortness of breath.   RESPIRATORY:   Negative for chest pain, shortness of breath.   MUSCULOSKELETAL:   See HPI for details.   NEUROLOGY:   Negative for tingling, numbness, weakness.    Objective    There were no vitals filed for this visit.    Knee Exam  Constitutional: Appears stated age. No apparent distress  Labored Breathing: No  Psychiatric: Normal mood and effect.   Neurological: alert and oriented x3  Skin: intact  MUSCULOSKELETAL: Neck: No tenderness. No pain or limitation with range of motion. Back: No tenderness. Straight leg test negative bilaterally. right knee: There is diffuse tenderness over the right knee.  There is crepitus and pain with and limitation of range of motion of the right knee.  McMurrays is negative. Anterior drawer and lachmans are negative. There is not an effusion present. The knee is stable to valgus and varus stressing. The patient walks with a painful gait favoring the right knee while walking.  Left knee: There is a well-healed, clean, dry incision from previous left total knee replacement.  There is no pain with range of motion.    AP and lateral x-rays of the left and right knees done and read in  office today show a left total knee replacement and osteoarthritis of the right knee.     Patient ID: Vilma Bernabe is a 83 y.o. female.    L Inj/Asp: R knee on 4/5/2024 1:09 PM  Indications: pain  Details: 22 G needle, medial approach  Medications: 40 mg triamcinolone acetonide 40 mg/mL; 1 mL lidocaine 10 mg/mL (1 %)  Outcome: tolerated well, no immediate complications  Procedure, treatment alternatives, risks and benefits explained, specific risks discussed. Immediately prior to procedure a time out was called to verify the correct patient, procedure, equipment, support staff and site/side marked as required. Patient was prepped and draped in the usual sterile fashion.       Vilma was seen today for pain.  Diagnoses and all orders for this visit:  H/O total knee replacement, left (Primary)  Right knee pain, unspecified chronicity  Primary osteoarthritis of right knee  -     L Inj/Asp  Instability of right knee joint  Options are discussed with the patient in detail. The patient is instructed regarding activity modification, ice, physician directed at home gentle strengthening and ROM exercises, total knee infection precautions for her left total knee replacement and the appropriate use of Tylenol as needed for pain with its potential adverse reactions and side effects. The patient understands. The patient states that despite all the treatment listed above that this right knee pain is debilitating and  requests a discussion of further options. Cortisone injection to the right knee is discussed in the office today. This is done in the office today. See procedures below. Return as needed, Please note that this report has been produced using speech recognition software.  It may contain errors related to grammar, punctuation or spelling.  Electronically signed, but not reviewed.       Cruz Multani MD

## 2024-04-29 ENCOUNTER — APPOINTMENT (OUTPATIENT)
Dept: PRIMARY CARE | Facility: CLINIC | Age: 84
End: 2024-04-29
Payer: MEDICARE

## 2024-05-13 DIAGNOSIS — I50.9 CONGESTIVE HEART FAILURE, UNSPECIFIED HF CHRONICITY, UNSPECIFIED HEART FAILURE TYPE (MULTI): ICD-10-CM

## 2024-05-14 RX ORDER — ATORVASTATIN CALCIUM 20 MG/1
20 TABLET, FILM COATED ORAL DAILY
Qty: 90 TABLET | Refills: 3 | Status: SHIPPED | OUTPATIENT
Start: 2024-05-14 | End: 2024-05-15 | Stop reason: SDUPTHER

## 2024-05-15 DIAGNOSIS — I50.9 CONGESTIVE HEART FAILURE, UNSPECIFIED HF CHRONICITY, UNSPECIFIED HEART FAILURE TYPE (MULTI): ICD-10-CM

## 2024-05-16 RX ORDER — ATORVASTATIN CALCIUM 20 MG/1
20 TABLET, FILM COATED ORAL DAILY
Qty: 90 TABLET | Refills: 3 | Status: SHIPPED | OUTPATIENT
Start: 2024-05-16

## 2024-08-12 ENCOUNTER — APPOINTMENT (OUTPATIENT)
Dept: PRIMARY CARE | Facility: CLINIC | Age: 84
End: 2024-08-12
Payer: MEDICARE

## 2024-08-15 ENCOUNTER — TELEPHONE (OUTPATIENT)
Dept: PRIMARY CARE | Facility: CLINIC | Age: 84
End: 2024-08-15
Payer: MEDICARE

## 2024-08-15 NOTE — TELEPHONE ENCOUNTER
Patient had a fall 5-6 weeks ago and hit her mouth on concrete. She didn't lost any teeth but her mouth is .    She said when she was eating dinner that the roof of her mouth and her surrounding teeth are still very sore, wondering if you can give advice on what she should do or someone you could refer her to?

## 2024-08-22 NOTE — TELEPHONE ENCOUNTER
Spoke with the Pt she states feeling much better.Pt does not know what she did to the roof of her mouth.

## 2024-08-27 ENCOUNTER — APPOINTMENT (OUTPATIENT)
Dept: PRIMARY CARE | Facility: CLINIC | Age: 84
End: 2024-08-27
Payer: MEDICARE

## 2024-09-19 DIAGNOSIS — I10 BENIGN ESSENTIAL HYPERTENSION: ICD-10-CM

## 2024-09-20 RX ORDER — METOPROLOL SUCCINATE 50 MG/1
50 TABLET, EXTENDED RELEASE ORAL DAILY
Qty: 90 TABLET | Refills: 3 | Status: SHIPPED | OUTPATIENT
Start: 2024-09-20

## 2024-09-26 DIAGNOSIS — I48.91 RAPID ATRIAL FIBRILLATION (MULTI): Primary | ICD-10-CM

## 2024-09-26 RX ORDER — APIXABAN 2.5 MG/1
2.5 TABLET, FILM COATED ORAL 2 TIMES DAILY
Qty: 60 TABLET | Refills: 11 | Status: SHIPPED | OUTPATIENT
Start: 2024-09-26

## 2024-10-04 ENCOUNTER — OFFICE VISIT (OUTPATIENT)
Dept: PRIMARY CARE | Facility: CLINIC | Age: 84
End: 2024-10-04
Payer: MEDICARE

## 2024-10-04 VITALS
HEART RATE: 73 BPM | BODY MASS INDEX: 24.51 KG/M2 | SYSTOLIC BLOOD PRESSURE: 120 MMHG | DIASTOLIC BLOOD PRESSURE: 72 MMHG | OXYGEN SATURATION: 95 % | WEIGHT: 134 LBS

## 2024-10-04 DIAGNOSIS — S05.01XA ABRASION OF RIGHT CORNEA, INITIAL ENCOUNTER: Primary | ICD-10-CM

## 2024-10-04 DIAGNOSIS — Z23 ENCOUNTER FOR IMMUNIZATION: ICD-10-CM

## 2024-10-04 PROCEDURE — 1160F RVW MEDS BY RX/DR IN RCRD: CPT | Performed by: PHYSICIAN ASSISTANT

## 2024-10-04 PROCEDURE — 1123F ACP DISCUSS/DSCN MKR DOCD: CPT | Performed by: PHYSICIAN ASSISTANT

## 2024-10-04 PROCEDURE — 99213 OFFICE O/P EST LOW 20 MIN: CPT | Mod: 25 | Performed by: PHYSICIAN ASSISTANT

## 2024-10-04 PROCEDURE — 1126F AMNT PAIN NOTED NONE PRSNT: CPT | Performed by: PHYSICIAN ASSISTANT

## 2024-10-04 PROCEDURE — 1158F ADVNC CARE PLAN TLK DOCD: CPT | Performed by: PHYSICIAN ASSISTANT

## 2024-10-04 PROCEDURE — 3078F DIAST BP <80 MM HG: CPT | Performed by: PHYSICIAN ASSISTANT

## 2024-10-04 PROCEDURE — 99213 OFFICE O/P EST LOW 20 MIN: CPT | Performed by: PHYSICIAN ASSISTANT

## 2024-10-04 PROCEDURE — 3074F SYST BP LT 130 MM HG: CPT | Performed by: PHYSICIAN ASSISTANT

## 2024-10-04 PROCEDURE — 1159F MED LIST DOCD IN RCRD: CPT | Performed by: PHYSICIAN ASSISTANT

## 2024-10-04 PROCEDURE — 1036F TOBACCO NON-USER: CPT | Performed by: PHYSICIAN ASSISTANT

## 2024-10-04 PROCEDURE — 90662 IIV NO PRSV INCREASED AG IM: CPT | Performed by: PHYSICIAN ASSISTANT

## 2024-10-04 RX ORDER — ERYTHROMYCIN 5 MG/G
OINTMENT OPHTHALMIC 4 TIMES DAILY
Qty: 3.5 G | Refills: 0 | Status: SHIPPED | OUTPATIENT
Start: 2024-10-04 | End: 2024-10-09

## 2024-10-04 RX ORDER — KETOROLAC TROMETHAMINE 4 MG/ML
1 SOLUTION/ DROPS OPHTHALMIC 4 TIMES DAILY
Qty: 5 ML | Refills: 0 | Status: SHIPPED | OUTPATIENT
Start: 2024-10-04 | End: 2024-10-07

## 2024-10-04 ASSESSMENT — ENCOUNTER SYMPTOMS
EYE PAIN: 1
EYE REDNESS: 1
EYE DISCHARGE: 1
FEVER: 0
EYE ITCHING: 0
PHOTOPHOBIA: 0

## 2024-10-04 ASSESSMENT — COLUMBIA-SUICIDE SEVERITY RATING SCALE - C-SSRS
1. IN THE PAST MONTH, HAVE YOU WISHED YOU WERE DEAD OR WISHED YOU COULD GO TO SLEEP AND NOT WAKE UP?: NO
2. HAVE YOU ACTUALLY HAD ANY THOUGHTS OF KILLING YOURSELF?: NO
6. HAVE YOU EVER DONE ANYTHING, STARTED TO DO ANYTHING, OR PREPARED TO DO ANYTHING TO END YOUR LIFE?: NO

## 2024-10-04 ASSESSMENT — PAIN SCALES - GENERAL: PAINLEVEL: 0-NO PAIN

## 2024-10-04 NOTE — PROGRESS NOTES
Subjective   Patient ID: Vilma Bernabe is a 83 y.o. female who presents for Eye Pain (Pt is here for right eye pain, since yesterday / nr).    HPI   States yesterday was driving and felt something enter her R eye. She tried flushing w/saline and using a q-tip to get it out. Does not wear contacts. Has constant tearing from the eye but no goopy discharge. Eye feels irritated but denies pain w/EOM.    Review of Systems   Constitutional:  Negative for fever.   Eyes:  Positive for pain (irritation), discharge and redness. Negative for photophobia, itching and visual disturbance.       Objective   /72   Pulse 73   Wt 60.8 kg (134 lb)   SpO2 95%   BMI 24.51 kg/m²     Physical Exam  Constitutional:       Appearance: Normal appearance.   HENT:      Head: Normocephalic and atraumatic.   Eyes:      Extraocular Movements: Extraocular movements intact.      Conjunctiva/sclera: Conjunctivae normal.      Pupils: Pupils are equal, round, and reactive to light.      Comments: R eye nml in appearance, EOM intact, pupil reactive. No obvious foreign body. On wood's lamp exam however, there is uptake of fluorescein in the cornea, approx 3mm area.   Cardiovascular:      Rate and Rhythm: Normal rate.   Pulmonary:      Effort: Pulmonary effort is normal.   Musculoskeletal:      Cervical back: Normal range of motion and neck supple.   Skin:     General: Skin is warm.      Findings: No rash.   Neurological:      General: No focal deficit present.      Mental Status: She is alert.         Assessment/Plan   Problem List Items Addressed This Visit    None  Visit Diagnoses         Codes    Abrasion of right cornea, initial encounter    -  Primary S05.01XA    Relevant Medications    erythromycin (Romycin) 5 mg/gram (0.5 %) ophthalmic ointment    ketorolac (Acular) 0.4 % ophthalmic solution          Unsure of the cost of ketorolac and may not be completely necessary though advised pt to take erythromycin regardless and can use  ketorolac as needed.    Has upcoming FU w/PCP next week.

## 2024-10-09 ENCOUNTER — OFFICE VISIT (OUTPATIENT)
Dept: PRIMARY CARE | Facility: CLINIC | Age: 84
End: 2024-10-09
Payer: MEDICARE

## 2024-10-09 VITALS
OXYGEN SATURATION: 98 % | BODY MASS INDEX: 24.66 KG/M2 | WEIGHT: 134 LBS | DIASTOLIC BLOOD PRESSURE: 80 MMHG | SYSTOLIC BLOOD PRESSURE: 114 MMHG | HEIGHT: 62 IN | HEART RATE: 78 BPM

## 2024-10-09 DIAGNOSIS — E78.00 PURE HYPERCHOLESTEROLEMIA: ICD-10-CM

## 2024-10-09 DIAGNOSIS — I48.91 RAPID ATRIAL FIBRILLATION (MULTI): Primary | ICD-10-CM

## 2024-10-09 PROCEDURE — 1159F MED LIST DOCD IN RCRD: CPT | Performed by: FAMILY MEDICINE

## 2024-10-09 PROCEDURE — 1123F ACP DISCUSS/DSCN MKR DOCD: CPT | Performed by: FAMILY MEDICINE

## 2024-10-09 PROCEDURE — 3079F DIAST BP 80-89 MM HG: CPT | Performed by: FAMILY MEDICINE

## 2024-10-09 PROCEDURE — 99213 OFFICE O/P EST LOW 20 MIN: CPT | Performed by: FAMILY MEDICINE

## 2024-10-09 PROCEDURE — 3074F SYST BP LT 130 MM HG: CPT | Performed by: FAMILY MEDICINE

## 2024-10-09 PROCEDURE — 1036F TOBACCO NON-USER: CPT | Performed by: FAMILY MEDICINE

## 2024-10-09 PROCEDURE — 1126F AMNT PAIN NOTED NONE PRSNT: CPT | Performed by: FAMILY MEDICINE

## 2024-10-09 ASSESSMENT — ENCOUNTER SYMPTOMS: PALPITATIONS: 0

## 2024-10-09 ASSESSMENT — PAIN SCALES - GENERAL: PAINLEVEL: 0-NO PAIN

## 2024-10-09 NOTE — PROGRESS NOTES
[facility]: [facility]  E/M EVALUATION    Vilma Bernabe is a 83 y.o. female who presents for Follow-up.    Subjective   Pt here for follow up.     Atrial fib- switching to Dr Min in cardiology.      Right knee pain-  wants to have surgery on it.      Hyperlipidemia- due for labs           Review of Systems   Cardiovascular:  Negative for chest pain, palpitations and leg swelling.       Objective   Vitals:    10/09/24 0948   BP: 114/80   Pulse: 78   SpO2: 98%     Physical Exam  Constitutional:       Appearance: Normal appearance.   Cardiovascular:      Rate and Rhythm: Normal rate and regular rhythm.      Heart sounds: No murmur heard.  Pulmonary:      Effort: Pulmonary effort is normal.      Breath sounds: Normal breath sounds.   Musculoskeletal:      Right lower leg: No edema.      Left lower leg: No edema.   Neurological:      Mental Status: She is alert.           Assessment/Plan      Patient Active Problem List   Diagnosis    H/O total knee replacement, left    Abnormal CAT scan    Abnormal skin growth    Abrasion, left knee, initial encounter    Chronic obstructive lung disease (Multi)    Anemia    Anxiety disorder    Artificial knee joint present    Attacks of weakness    Benign essential hypertension    Bilateral pleural effusion    Bradycardia    Chronic atrial fibrillation (Multi)    Chronic respiratory failure with hypoxia (Multi)    Closed fracture of carpal bone    Congestive heart failure    Chronic cough    Depression    Dislocation of left wrist    Dyspnea on exertion    Fatigue    Haemophilus influenzae pneumonia (CMS-HCC)    Hand sprain    History of knee surgery    History of tricuspid valve repair    Pure hypercholesterolemia    Hypersensitivity reaction    Hypertension    Hypoxemia    Hypoxia    ILD (interstitial lung disease) (Multi)    Inability to walk    Instability of left knee joint    Long-term exposure involving bird droppings    Macrocytosis without anemia     Mitral regurgitation    Osteoarthritis of left knee    Knee osteoarthritis    Other dental procedure status    Overweight    Senile purpura (CMS-HCC)    Skin mass    Stage 3 chronic kidney disease (Multi)    Cardiomyopathy    Fall    Migraine without aura    Thoracic aortic aneurysm without rupture (CMS-HCC)    Essential hypertension    Right knee pain    Instability of right knee joint       Diagnoses and all orders for this visit:  Rapid atrial fibrillation (Multi)  -     Comprehensive Metabolic Panel; Future  -     CBC and Auto Differential; Future  -     Digoxin level; Future  Pure hypercholesterolemia  -     Lipid Panel; Future  Need for vaccination      The patient was encouraged to ensure that any/all documentation is accurate and up to date, and that our office be provided a copy in the event that anything changes.         Gee Ramsey MD

## 2024-11-08 ENCOUNTER — TELEPHONE (OUTPATIENT)
Dept: PRIMARY CARE | Facility: CLINIC | Age: 84
End: 2024-11-08

## 2024-11-08 ENCOUNTER — APPOINTMENT (OUTPATIENT)
Dept: CARDIOLOGY | Facility: CLINIC | Age: 84
End: 2024-11-08
Payer: MEDICARE

## 2024-11-08 DIAGNOSIS — F41.9 ANXIETY DISORDER, UNSPECIFIED TYPE: Primary | ICD-10-CM

## 2024-11-08 RX ORDER — FLUOXETINE 10 MG/1
10 CAPSULE ORAL DAILY
Qty: 30 CAPSULE | Refills: 1 | Status: SHIPPED | OUTPATIENT
Start: 2024-11-08 | End: 2025-01-07

## 2024-11-08 NOTE — TELEPHONE ENCOUNTER
Patient called requesting medication for anxiety. She said she has been having a hard time coping with life, and is experiencing constant physical and mental sx from anxiety.

## 2024-11-13 ENCOUNTER — APPOINTMENT (OUTPATIENT)
Age: 84
End: 2024-11-13
Payer: MEDICARE

## 2024-11-15 ENCOUNTER — APPOINTMENT (OUTPATIENT)
Dept: ORTHOPEDIC SURGERY | Facility: CLINIC | Age: 84
End: 2024-11-15
Payer: MEDICARE

## 2024-11-19 ENCOUNTER — APPOINTMENT (OUTPATIENT)
Dept: PRIMARY CARE | Facility: CLINIC | Age: 84
End: 2024-11-19
Payer: MEDICARE

## 2024-11-20 ENCOUNTER — OFFICE VISIT (OUTPATIENT)
Dept: CARDIOLOGY | Facility: CLINIC | Age: 84
End: 2024-11-20
Payer: MEDICARE

## 2024-11-20 VITALS
HEART RATE: 64 BPM | DIASTOLIC BLOOD PRESSURE: 70 MMHG | OXYGEN SATURATION: 89 % | BODY MASS INDEX: 24.69 KG/M2 | WEIGHT: 135 LBS | SYSTOLIC BLOOD PRESSURE: 119 MMHG

## 2024-11-20 DIAGNOSIS — I48.91 ATRIAL FIBRILLATION, UNSPECIFIED TYPE (MULTI): Primary | ICD-10-CM

## 2024-11-20 DIAGNOSIS — I48.11 LONGSTANDING PERSISTENT ATRIAL FIBRILLATION (MULTI): ICD-10-CM

## 2024-11-20 DIAGNOSIS — I50.22 CHRONIC SYSTOLIC CONGESTIVE HEART FAILURE: ICD-10-CM

## 2024-11-20 DIAGNOSIS — I10 ESSENTIAL HYPERTENSION: ICD-10-CM

## 2024-11-20 PROCEDURE — 99204 OFFICE O/P NEW MOD 45 MIN: CPT | Performed by: INTERNAL MEDICINE

## 2024-11-20 PROCEDURE — 93005 ELECTROCARDIOGRAM TRACING: CPT | Performed by: INTERNAL MEDICINE

## 2024-11-20 PROCEDURE — 1123F ACP DISCUSS/DSCN MKR DOCD: CPT | Performed by: INTERNAL MEDICINE

## 2024-11-20 PROCEDURE — 93010 ELECTROCARDIOGRAM REPORT: CPT | Performed by: INTERNAL MEDICINE

## 2024-11-20 PROCEDURE — 1159F MED LIST DOCD IN RCRD: CPT | Performed by: INTERNAL MEDICINE

## 2024-11-20 PROCEDURE — 1036F TOBACCO NON-USER: CPT | Performed by: INTERNAL MEDICINE

## 2024-11-20 PROCEDURE — 1126F AMNT PAIN NOTED NONE PRSNT: CPT | Performed by: INTERNAL MEDICINE

## 2024-11-20 PROCEDURE — 99214 OFFICE O/P EST MOD 30 MIN: CPT | Performed by: INTERNAL MEDICINE

## 2024-11-20 PROCEDURE — 3078F DIAST BP <80 MM HG: CPT | Performed by: INTERNAL MEDICINE

## 2024-11-20 PROCEDURE — 3074F SYST BP LT 130 MM HG: CPT | Performed by: INTERNAL MEDICINE

## 2024-11-20 ASSESSMENT — PATIENT HEALTH QUESTIONNAIRE - PHQ9
2. FEELING DOWN, DEPRESSED OR HOPELESS: NOT AT ALL
SUM OF ALL RESPONSES TO PHQ9 QUESTIONS 1 AND 2: 0
1. LITTLE INTEREST OR PLEASURE IN DOING THINGS: NOT AT ALL
SUM OF ALL RESPONSES TO PHQ9 QUESTIONS 1 AND 2: 0
1. LITTLE INTEREST OR PLEASURE IN DOING THINGS: NOT AT ALL
2. FEELING DOWN, DEPRESSED OR HOPELESS: NOT AT ALL
2. FEELING DOWN, DEPRESSED OR HOPELESS: NOT AT ALL
1. LITTLE INTEREST OR PLEASURE IN DOING THINGS: NOT AT ALL
SUM OF ALL RESPONSES TO PHQ9 QUESTIONS 1 AND 2: 0

## 2024-11-20 ASSESSMENT — ENCOUNTER SYMPTOMS
DISTURBANCES IN COORDINATION: 1
DEPRESSION: 0
COUGH: 0
LOSS OF BALANCE: 1
FEVER: 0
OCCASIONAL FEELINGS OF UNSTEADINESS: 1
CONSTITUTIONAL NEGATIVE: 1
RESPIRATORY NEGATIVE: 1
GASTROINTESTINAL NEGATIVE: 1
MUSCULOSKELETAL NEGATIVE: 1
LOSS OF SENSATION IN FEET: 0
EYES NEGATIVE: 1
ENDOCRINE NEGATIVE: 1
CHILLS: 0

## 2024-11-20 ASSESSMENT — PAIN SCALES - GENERAL: PAINLEVEL_OUTOF10: 0-NO PAIN

## 2024-11-20 NOTE — PROGRESS NOTES
Subjective      Chief Complaint   Patient presents with    Transfer from [facility], not feeling well    sob exertion          This 84-year-old white female who comes in with complaints of not feeling quite right.  She does have a history of a mitral valve replacement with tricuspid repair in 2015 and atrial appendage was clipped.  She does have chronic atrial fibrillation.  She was on Coumadin for years and was switched to Eliquis.  She was evaluated for a Watchman procedure but evidently does not qualify.  She was placed on 2.5 Eliquis twice daily she said she had some blood in her stool and cut it back to once a day.  Approximately 3 weeks ago she woke up with no control of her equilibrium.  This is continued she is not dizzy or lightheaded the room was not spinning on her.  She not complain symptoms of PND orthopnea.  Her last echocardiogram did show the EF was 40%.  EKG today shows atrial fibrillation.  Her legs not swelling up on her.  As stated the room was not spinning.  Movement of her head does not affect this.  She has no history of diabetes mellitus she is not a smoker there is no family's coronary disease and she does have high cholesterol.  There is a history of hypertension.           Review of Systems   Constitutional: Negative. Negative for chills and fever.   HENT: Negative.     Eyes: Negative.    Respiratory: Negative.  Negative for cough.    Endocrine: Negative.    Skin: Negative.    Musculoskeletal: Negative.    Gastrointestinal: Negative.    Genitourinary: Negative.    Neurological:  Positive for disturbances in coordination and loss of balance.   All other systems reviewed and are negative.       Past Surgical History:   Procedure Laterality Date    AORTIC VALVE REPLACEMENT      KNEE SURGERY  05/03/2013    Knee Surgery left    TONSILLECTOMY  04/09/2013    Tonsillectomy    TRICUSPID VALVE REPLACEMENT          Active Ambulatory Problems     Diagnosis Date Noted    H/O total knee replacement, left  09/14/2023    Abnormal CAT scan 09/14/2023    Abnormal skin growth 07/16/2019    Abrasion, left knee, initial encounter 09/14/2023    Chronic obstructive lung disease (Multi) 09/14/2023    Anemia 09/14/2023    Anxiety disorder 09/14/2023    Artificial knee joint present 09/14/2023    Attacks of weakness 09/14/2023    Benign essential hypertension 09/14/2023    Bilateral pleural effusion 09/14/2023    Bradycardia 09/14/2023    Chronic atrial fibrillation (Multi) 11/15/2012    Chronic respiratory failure with hypoxia (Multi) 09/14/2023    Closed fracture of carpal bone 09/14/2023    Congestive heart failure 08/16/2019    Chronic cough 09/14/2023    Depression 09/14/2023    Dislocation of left wrist 09/14/2023    Dyspnea on exertion 11/15/2012    Fatigue 09/14/2023    Haemophilus influenzae pneumonia (CMS-HCC) 09/14/2023    Hand sprain 09/14/2023    History of knee surgery 09/14/2023    History of tricuspid valve repair 09/14/2023    Pure hypercholesterolemia 09/14/2023    Hypersensitivity reaction 09/14/2023    Hypertension 09/14/2023    Hypoxemia 09/14/2023    Hypoxia 09/14/2023    ILD (interstitial lung disease) (Multi) 09/14/2023    Inability to walk 09/14/2023    Instability of left knee joint 09/14/2023    Long-term exposure involving bird droppings 09/14/2023    Macrocytosis without anemia 10/30/2018    Mitral regurgitation 09/14/2023    Osteoarthritis of left knee 09/14/2023    Knee osteoarthritis 09/14/2023    Other dental procedure status 09/14/2023    Overweight 07/24/2019    Senile purpura (CMS-HCC) 09/14/2023    Skin mass 09/14/2023    Stage 3 chronic kidney disease (Multi) 09/14/2023    Cardiomyopathy 04/02/2024    Fall 06/21/2021    Migraine without aura 04/02/2024    Thoracic aortic aneurysm without rupture (CMS-HCC) 04/02/2024    Essential hypertension 04/02/2024    Right knee pain 04/05/2024    Instability of right knee joint 04/05/2024    Longstanding persistent atrial fibrillation (Multi) 11/20/2024  "    Resolved Ambulatory Problems     Diagnosis Date Noted    No Resolved Ambulatory Problems     Past Medical History:   Diagnosis Date    Atrial fibrillation (Multi)     CAD (coronary artery disease)     CHF (congestive heart failure)     Disappearance and death of family member 04/09/2013        Visit Vitals  /70   Pulse 64   Wt 61.2 kg (135 lb)   SpO2 (!) 89%   BMI 24.69 kg/m²   Smoking Status Never   BSA 1.64 m²        Objective     Constitutional:       Appearance: Healthy appearance.   Neck:      Vascular: No JVR.   Pulmonary:      Effort: Pulmonary effort is normal.      Breath sounds: Normal breath sounds.   Cardiovascular:      PMI at left midclavicular line. Normal rate. Irregularly irregular rhythm. Normal S1. Normal S2.       Murmurs: There is no murmur.      No gallop.  No click. No rub.   Pulses:     Intact distal pulses.   Abdominal:      Palpations: Abdomen is soft.   Musculoskeletal: Normal range of motion. Skin:     General: Skin is warm and dry.   Neurological:      General: No focal deficit present.            Lab Review:         Lab Results   Component Value Date    CHOL 182 03/23/2023    CHOL 85 (L) 05/12/2022    CHOL 114 01/24/2022     Lab Results   Component Value Date    HDL 55 03/23/2023    HDL 58 05/12/2022    HDL 45.4 01/24/2022     Lab Results   Component Value Date    LDLCALC 97 03/23/2023    LDLCALC 18 (L) 05/12/2022    LDLCALC 29 (L) 08/10/2021     Lab Results   Component Value Date    TRIG 152 (H) 03/23/2023    TRIG 44 05/12/2022    TRIG 119 01/24/2022     No components found for: \"CHOLHDL\"     Assessment/Plan     Longstanding persistent atrial fibrillation (Multi)  This is a 84-year-old white female who does have valvular heart problems had mitral valve replaced and tricuspid valve repair in 2015.  She does have atrial fibrillation.  With the surgery she did have the left atrial appendage clipped.  She now comes in with problems with her equilibrium.  She is on a low-dose of " the Eliquis at 2.5 once a day having cut it back herself.  Question if she may have had a stroke with her equilibrium.  Would like to get a CAT scan of her head.  She is seeing her primary feel that she may be more depressed and an increase of Prozac.  Do not feel she is in congestive heart failure presently.    Essential hypertension  Her blood pressure is well-maintained.    Congestive heart failure (Multi)  Feels she does have chronic congestive heart failure with an EF of 40%.

## 2024-11-20 NOTE — ASSESSMENT & PLAN NOTE
This is a 84-year-old white female who does have valvular heart problems had mitral valve replaced and tricuspid valve repair in 2015.  She does have atrial fibrillation.  With the surgery she did have the left atrial appendage clipped.  She now comes in with problems with her equilibrium.  She is on a low-dose of the Eliquis at 2.5 once a day having cut it back herself.  Question if she may have had a stroke with her equilibrium.  Would like to get a CAT scan of her head.  She is seeing her primary feel that she may be more depressed and an increase of Prozac.  Do not feel she is in congestive heart failure presently.

## 2024-11-22 ENCOUNTER — APPOINTMENT (OUTPATIENT)
Dept: ORTHOPEDIC SURGERY | Facility: CLINIC | Age: 84
End: 2024-11-22
Payer: MEDICARE

## 2024-11-22 DIAGNOSIS — M25.361 INSTABILITY OF RIGHT KNEE JOINT: ICD-10-CM

## 2024-11-22 DIAGNOSIS — M17.11 PRIMARY OSTEOARTHRITIS OF RIGHT KNEE: ICD-10-CM

## 2024-11-22 DIAGNOSIS — M25.561 RIGHT KNEE PAIN, UNSPECIFIED CHRONICITY: Primary | ICD-10-CM

## 2024-12-09 ENCOUNTER — APPOINTMENT (OUTPATIENT)
Dept: RADIOLOGY | Facility: CLINIC | Age: 84
End: 2024-12-09
Payer: MEDICARE

## 2024-12-10 ENCOUNTER — LAB (OUTPATIENT)
Dept: LAB | Facility: LAB | Age: 84
End: 2024-12-10
Payer: MEDICARE

## 2024-12-10 DIAGNOSIS — I48.91 ATRIAL FIBRILLATION, UNSPECIFIED TYPE (MULTI): ICD-10-CM

## 2024-12-10 LAB
CREAT SERPL-MCNC: 0.99 MG/DL (ref 0.5–1.05)
EGFRCR SERPLBLD CKD-EPI 2021: 56 ML/MIN/1.73M*2

## 2024-12-10 PROCEDURE — 36415 COLL VENOUS BLD VENIPUNCTURE: CPT

## 2024-12-10 PROCEDURE — 82565 ASSAY OF CREATININE: CPT

## 2024-12-11 ENCOUNTER — HOSPITAL ENCOUNTER (OUTPATIENT)
Dept: RADIOLOGY | Facility: CLINIC | Age: 84
Discharge: HOME | End: 2024-12-11
Payer: MEDICARE

## 2024-12-11 DIAGNOSIS — I48.91 ATRIAL FIBRILLATION, UNSPECIFIED TYPE (MULTI): ICD-10-CM

## 2024-12-11 PROCEDURE — 70470 CT HEAD/BRAIN W/O & W/DYE: CPT

## 2024-12-11 PROCEDURE — 2550000001 HC RX 255 CONTRASTS: Performed by: INTERNAL MEDICINE

## 2024-12-11 PROCEDURE — 70470 CT HEAD/BRAIN W/O & W/DYE: CPT | Performed by: RADIOLOGY

## 2024-12-23 ENCOUNTER — TELEPHONE (OUTPATIENT)
Dept: PRIMARY CARE | Facility: CLINIC | Age: 84
End: 2024-12-23
Payer: MEDICARE

## 2025-01-07 ENCOUNTER — APPOINTMENT (OUTPATIENT)
Dept: PRIMARY CARE | Facility: CLINIC | Age: 85
End: 2025-01-07
Payer: MEDICARE

## 2025-01-16 ENCOUNTER — APPOINTMENT (OUTPATIENT)
Dept: PRIMARY CARE | Facility: CLINIC | Age: 85
End: 2025-01-16
Payer: MEDICARE

## 2025-02-12 DIAGNOSIS — E78.00 PURE HYPERCHOLESTEROLEMIA: Primary | ICD-10-CM

## 2025-02-12 RX ORDER — ATORVASTATIN CALCIUM 40 MG/1
40 TABLET, FILM COATED ORAL DAILY
COMMUNITY
Start: 2025-02-04 | End: 2025-02-12 | Stop reason: SDUPTHER

## 2025-02-13 RX ORDER — ATORVASTATIN CALCIUM 40 MG/1
40 TABLET, FILM COATED ORAL DAILY
Qty: 90 TABLET | Refills: 3 | Status: SHIPPED | OUTPATIENT
Start: 2025-02-13

## 2025-02-14 ENCOUNTER — APPOINTMENT (OUTPATIENT)
Dept: PRIMARY CARE | Facility: CLINIC | Age: 85
End: 2025-02-14
Payer: MEDICARE

## 2025-02-18 ENCOUNTER — APPOINTMENT (OUTPATIENT)
Age: 85
End: 2025-02-18
Payer: MEDICARE

## 2025-02-19 ENCOUNTER — TELEPHONE (OUTPATIENT)
Dept: PRIMARY CARE | Facility: CLINIC | Age: 85
End: 2025-02-19

## 2025-02-19 ENCOUNTER — APPOINTMENT (OUTPATIENT)
Dept: PRIMARY CARE | Facility: CLINIC | Age: 85
End: 2025-02-19
Payer: MEDICARE

## 2025-02-19 VITALS
HEART RATE: 58 BPM | WEIGHT: 139 LBS | DIASTOLIC BLOOD PRESSURE: 70 MMHG | SYSTOLIC BLOOD PRESSURE: 126 MMHG | HEIGHT: 62 IN | OXYGEN SATURATION: 94 % | TEMPERATURE: 98 F | BODY MASS INDEX: 25.58 KG/M2

## 2025-02-19 DIAGNOSIS — J96.11 CHRONIC RESPIRATORY FAILURE WITH HYPOXIA (MULTI): Primary | ICD-10-CM

## 2025-02-19 DIAGNOSIS — I48.91 RAPID ATRIAL FIBRILLATION (MULTI): ICD-10-CM

## 2025-02-19 DIAGNOSIS — E78.00 PURE HYPERCHOLESTEROLEMIA: ICD-10-CM

## 2025-02-19 DIAGNOSIS — I10 BENIGN ESSENTIAL HYPERTENSION: ICD-10-CM

## 2025-02-19 DIAGNOSIS — F41.9 ANXIETY DISORDER, UNSPECIFIED TYPE: ICD-10-CM

## 2025-02-19 DIAGNOSIS — I50.9 CONGESTIVE HEART FAILURE, UNSPECIFIED HF CHRONICITY, UNSPECIFIED HEART FAILURE TYPE: ICD-10-CM

## 2025-02-19 PROBLEM — E11.9 DIABETES MELLITUS (MULTI): Status: ACTIVE | Noted: 2025-02-19

## 2025-02-19 PROCEDURE — 1126F AMNT PAIN NOTED NONE PRSNT: CPT | Performed by: STUDENT IN AN ORGANIZED HEALTH CARE EDUCATION/TRAINING PROGRAM

## 2025-02-19 PROCEDURE — 1123F ACP DISCUSS/DSCN MKR DOCD: CPT | Performed by: STUDENT IN AN ORGANIZED HEALTH CARE EDUCATION/TRAINING PROGRAM

## 2025-02-19 PROCEDURE — 1159F MED LIST DOCD IN RCRD: CPT | Performed by: STUDENT IN AN ORGANIZED HEALTH CARE EDUCATION/TRAINING PROGRAM

## 2025-02-19 PROCEDURE — 99214 OFFICE O/P EST MOD 30 MIN: CPT | Performed by: STUDENT IN AN ORGANIZED HEALTH CARE EDUCATION/TRAINING PROGRAM

## 2025-02-19 PROCEDURE — 3074F SYST BP LT 130 MM HG: CPT | Performed by: STUDENT IN AN ORGANIZED HEALTH CARE EDUCATION/TRAINING PROGRAM

## 2025-02-19 PROCEDURE — 3078F DIAST BP <80 MM HG: CPT | Performed by: STUDENT IN AN ORGANIZED HEALTH CARE EDUCATION/TRAINING PROGRAM

## 2025-02-19 RX ORDER — FLUTICASONE FUROATE, UMECLIDINIUM BROMIDE AND VILANTEROL TRIFENATATE 100; 62.5; 25 UG/1; UG/1; UG/1
1 POWDER RESPIRATORY (INHALATION) DAILY
Qty: 90 EACH | Refills: 1 | Status: SHIPPED | OUTPATIENT
Start: 2025-02-19

## 2025-02-19 RX ORDER — FLUOXETINE HYDROCHLORIDE 20 MG/1
20 CAPSULE ORAL
Qty: 90 CAPSULE | Refills: 3 | Status: SHIPPED | OUTPATIENT
Start: 2025-02-19

## 2025-02-19 RX ORDER — FUROSEMIDE 20 MG/1
20 TABLET ORAL DAILY
Qty: 90 TABLET | Refills: 1 | Status: SHIPPED | OUTPATIENT
Start: 2025-02-19

## 2025-02-19 RX ORDER — METOPROLOL SUCCINATE 50 MG/1
50 TABLET, EXTENDED RELEASE ORAL DAILY
Qty: 90 TABLET | Refills: 3 | Status: SHIPPED | OUTPATIENT
Start: 2025-02-19

## 2025-02-19 RX ORDER — ATORVASTATIN CALCIUM 40 MG/1
40 TABLET, FILM COATED ORAL DAILY
Qty: 90 TABLET | Refills: 3 | Status: SHIPPED | OUTPATIENT
Start: 2025-02-19

## 2025-02-19 ASSESSMENT — PAIN SCALES - GENERAL: PAINLEVEL_OUTOF10: 0-NO PAIN

## 2025-02-19 NOTE — ASSESSMENT & PLAN NOTE
Orders:    empagliflozin (Jardiance) 10 mg; Take 1 tablet (10 mg) by mouth once daily.    Comprehensive metabolic panel; Future    CBC; Future

## 2025-02-19 NOTE — ASSESSMENT & PLAN NOTE
Orders:    metoprolol succinate XL (Toprol-XL) 50 mg 24 hr tablet; Take 1 tablet (50 mg) by mouth once daily.

## 2025-02-19 NOTE — ASSESSMENT & PLAN NOTE
Orders:    fluticasone-umeclidin-vilanter (Trelegy Ellipta) 100-62.5-25 mcg blister with device; Inhale 1 puff early in the morning..    furosemide (Lasix) 20 mg tablet; Take 1 tablet (20 mg) by mouth once daily.  Patient has not been treated any time recently.  Given history of oxygen prescription, would likely benefit from triple-therapy.  Rx for Trelegy sent.

## 2025-02-19 NOTE — ASSESSMENT & PLAN NOTE
Orders:    atorvastatin (Lipitor) 40 mg tablet; Take 1 tablet (40 mg) by mouth once daily.    Lipid panel; Future

## 2025-02-19 NOTE — PROGRESS NOTES
"Subjective   Patient ID: Vilma Bernabe is a 84 y.o. female who presents for Establish Care.    Here today to establish care.      History of valve repair.  Also has had total knee arthroplasty.     Has seen pulmonology in the distant past.  Prescribed O2 - stopped using because she found it impractical to carry around with her.  Never taken any maintenance inhalers.         Review of Systems   Constitutional:  Negative for chills, fatigue and fever.   HENT:  Negative for congestion, hearing loss, rhinorrhea, sinus pressure, sinus pain and tinnitus.    Eyes:  Negative for visual disturbance.   Respiratory:  Negative for cough, shortness of breath and wheezing.    Cardiovascular:  Negative for chest pain, palpitations and leg swelling.   Gastrointestinal:  Negative for constipation, diarrhea, nausea and vomiting.   Endocrine: Negative for cold intolerance, heat intolerance, polydipsia and polyuria.   Genitourinary:  Negative for dysuria, frequency, menstrual problem, urgency and vaginal discharge.   Musculoskeletal:  Negative for arthralgias and myalgias.   Skin:  Negative for pallor, rash and wound.   Neurological:  Negative for dizziness, light-headedness and headaches.   Psychiatric/Behavioral:  Negative for dysphoric mood and sleep disturbance. The patient is not nervous/anxious.        Objective     Visit Vitals  /70   Pulse 58   Temp 36.7 °C (98 °F)   Ht 1.575 m (5' 2\")   Wt 63 kg (139 lb)   SpO2 94%   BMI 25.42 kg/m²   Smoking Status Former   BSA 1.66 m²         Physical Exam  Constitutional:       Appearance: Normal appearance.   HENT:      Head: Normocephalic and atraumatic.      Right Ear: Tympanic membrane, ear canal and external ear normal.      Left Ear: Tympanic membrane, ear canal and external ear normal.      Nose: Nose normal.      Mouth/Throat:      Mouth: Mucous membranes are moist.      Pharynx: Oropharynx is clear.   Eyes:      Extraocular Movements: Extraocular movements intact.      " Conjunctiva/sclera: Conjunctivae normal.      Pupils: Pupils are equal, round, and reactive to light.   Cardiovascular:      Rate and Rhythm: Normal rate. Rhythm irregular.      Pulses: Normal pulses.      Heart sounds: Murmur heard.   Pulmonary:      Effort: Pulmonary effort is normal.      Breath sounds: Normal breath sounds.   Abdominal:      General: There is no distension.      Palpations: Abdomen is soft.      Tenderness: There is no abdominal tenderness.   Musculoskeletal:         General: Normal range of motion.      Cervical back: Normal range of motion.   Lymphadenopathy:      Cervical: No cervical adenopathy.   Skin:     General: Skin is warm and dry.   Neurological:      Mental Status: She is alert and oriented to person, place, and time. Mental status is at baseline.   Psychiatric:         Mood and Affect: Mood normal.         Behavior: Behavior normal.         Assessment & Plan  Chronic respiratory failure with hypoxia (Multi)    Orders:    fluticasone-umeclidin-vilanter (Trelegy Ellipta) 100-62.5-25 mcg blister with device; Inhale 1 puff early in the morning..    furosemide (Lasix) 20 mg tablet; Take 1 tablet (20 mg) by mouth once daily.  Patient has not been treated any time recently.  Given history of oxygen prescription, would likely benefit from triple-therapy.  Rx for Trelegy sent.   Pure hypercholesterolemia    Orders:    atorvastatin (Lipitor) 40 mg tablet; Take 1 tablet (40 mg) by mouth once daily.    Lipid panel; Future    Rapid atrial fibrillation (Multi)    Orders:    apixaban (Eliquis) 2.5 mg tablet; Take 1 tablet (2.5 mg) by mouth 2 times a day.  based on chart review, warfarin would be more appropriate.  Patient aware that this   Congestive heart failure, unspecified HF chronicity, unspecified heart failure type    Orders:    empagliflozin (Jardiance) 10 mg; Take 1 tablet (10 mg) by mouth once daily.    Comprehensive metabolic panel; Future    CBC; Future    Anxiety disorder, unspecified  type    Orders:    FLUoxetine (PROzac) 20 mg capsule; Take 1 capsule (20 mg) by mouth once daily in the morning. Take before meals.  doing well on current treatment.  Refills sent.   Benign essential hypertension    Orders:    metoprolol succinate XL (Toprol-XL) 50 mg 24 hr tablet; Take 1 tablet (50 mg) by mouth once daily.         Reviewed and approved by EBONI KIM on 2/20/25 at 12:15 PM.

## 2025-02-19 NOTE — ASSESSMENT & PLAN NOTE
Orders:    FLUoxetine (PROzac) 20 mg capsule; Take 1 capsule (20 mg) by mouth once daily in the morning. Take before meals.  doing well on current treatment.  Refills sent.

## 2025-02-20 ASSESSMENT — ENCOUNTER SYMPTOMS
FEVER: 0
NERVOUS/ANXIOUS: 0
NAUSEA: 0
MYALGIAS: 0
FREQUENCY: 0
SHORTNESS OF BREATH: 0
DYSURIA: 0
CONSTIPATION: 0
SINUS PRESSURE: 0
DIARRHEA: 0
FATIGUE: 0
DYSPHORIC MOOD: 0
CHILLS: 0
SINUS PAIN: 0
WHEEZING: 0
HEADACHES: 0
LIGHT-HEADEDNESS: 0
VOMITING: 0
COUGH: 0
PALPITATIONS: 0
SLEEP DISTURBANCE: 0
ARTHRALGIAS: 0
POLYDIPSIA: 0
DIZZINESS: 0
WOUND: 0
RHINORRHEA: 0

## 2025-02-24 DIAGNOSIS — I50.9 CONGESTIVE HEART FAILURE, UNSPECIFIED HF CHRONICITY, UNSPECIFIED HEART FAILURE TYPE: ICD-10-CM

## 2025-02-24 RX ORDER — EMPAGLIFLOZIN 10 MG/1
10 TABLET, FILM COATED ORAL DAILY
Qty: 90 TABLET | Refills: 3 | Status: SHIPPED | OUTPATIENT
Start: 2025-02-24

## 2025-02-25 ENCOUNTER — APPOINTMENT (OUTPATIENT)
Age: 85
End: 2025-02-25
Payer: MEDICARE

## 2025-02-28 LAB
ALBUMIN SERPL-MCNC: 4.3 G/DL (ref 3.6–5.1)
ALP SERPL-CCNC: 104 U/L (ref 37–153)
ALT SERPL-CCNC: 14 U/L (ref 6–29)
ANION GAP SERPL CALCULATED.4IONS-SCNC: 8 MMOL/L (CALC) (ref 7–17)
AST SERPL-CCNC: 21 U/L (ref 10–35)
BILIRUB SERPL-MCNC: 1 MG/DL (ref 0.2–1.2)
BUN SERPL-MCNC: 20 MG/DL (ref 7–25)
CALCIUM SERPL-MCNC: 9.2 MG/DL (ref 8.6–10.4)
CHLORIDE SERPL-SCNC: 104 MMOL/L (ref 98–110)
CHOLEST SERPL-MCNC: 152 MG/DL
CHOLEST/HDLC SERPL: 1.9 (CALC)
CO2 SERPL-SCNC: 29 MMOL/L (ref 20–32)
CREAT SERPL-MCNC: 1.16 MG/DL (ref 0.6–0.95)
EGFRCR SERPLBLD CKD-EPI 2021: 46 ML/MIN/1.73M2
ERYTHROCYTE [DISTWIDTH] IN BLOOD BY AUTOMATED COUNT: 13.7 % (ref 11–15)
GLUCOSE SERPL-MCNC: 97 MG/DL (ref 65–99)
HCT VFR BLD AUTO: 39.1 % (ref 35–45)
HDLC SERPL-MCNC: 79 MG/DL
HGB BLD-MCNC: 13 G/DL (ref 11.7–15.5)
LDLC SERPL CALC-MCNC: 57 MG/DL (CALC)
MCH RBC QN AUTO: 34.8 PG (ref 27–33)
MCHC RBC AUTO-ENTMCNC: 33.2 G/DL (ref 32–36)
MCV RBC AUTO: 104.5 FL (ref 80–100)
NONHDLC SERPL-MCNC: 73 MG/DL (CALC)
PLATELET # BLD AUTO: 235 THOUSAND/UL (ref 140–400)
PMV BLD REES-ECKER: 10.5 FL (ref 7.5–12.5)
POTASSIUM SERPL-SCNC: 4.4 MMOL/L (ref 3.5–5.3)
PROT SERPL-MCNC: 7.2 G/DL (ref 6.1–8.1)
RBC # BLD AUTO: 3.74 MILLION/UL (ref 3.8–5.1)
SODIUM SERPL-SCNC: 141 MMOL/L (ref 135–146)
TRIGL SERPL-MCNC: 80 MG/DL
WBC # BLD AUTO: 5.5 THOUSAND/UL (ref 3.8–10.8)

## 2025-04-07 ENCOUNTER — TELEPHONE (OUTPATIENT)
Dept: PRIMARY CARE | Facility: CLINIC | Age: 85
End: 2025-04-07

## 2025-04-07 NOTE — TELEPHONE ENCOUNTER
Kim, from [facility], called to find out if Jardiance has been discontinued for this patient - she reportedly refused it from the pharmacy and hasn't refilled it in a while. He would like a call back when we find out if she is no longer taking it and if she will be starting a new medication to replace it. Please advise.  Kim: [phone]

## 2025-04-09 ENCOUNTER — APPOINTMENT (OUTPATIENT)
Dept: PRIMARY CARE | Facility: CLINIC | Age: 85
End: 2025-04-09
Payer: MEDICARE

## 2025-04-14 ENCOUNTER — APPOINTMENT (OUTPATIENT)
Age: 85
End: 2025-04-14
Payer: MEDICARE

## 2025-04-14 VITALS
HEART RATE: 106 BPM | BODY MASS INDEX: 24.14 KG/M2 | SYSTOLIC BLOOD PRESSURE: 118 MMHG | OXYGEN SATURATION: 93 % | WEIGHT: 132 LBS | DIASTOLIC BLOOD PRESSURE: 78 MMHG

## 2025-04-14 DIAGNOSIS — I48.20 CHRONIC ATRIAL FIBRILLATION (MULTI): ICD-10-CM

## 2025-04-14 DIAGNOSIS — I10 BENIGN ESSENTIAL HYPERTENSION: Primary | ICD-10-CM

## 2025-04-14 PROCEDURE — 1123F ACP DISCUSS/DSCN MKR DOCD: CPT | Performed by: INTERNAL MEDICINE

## 2025-04-14 PROCEDURE — 99204 OFFICE O/P NEW MOD 45 MIN: CPT | Performed by: INTERNAL MEDICINE

## 2025-04-14 PROCEDURE — 3074F SYST BP LT 130 MM HG: CPT | Performed by: INTERNAL MEDICINE

## 2025-04-14 PROCEDURE — 1159F MED LIST DOCD IN RCRD: CPT | Performed by: INTERNAL MEDICINE

## 2025-04-14 PROCEDURE — 3078F DIAST BP <80 MM HG: CPT | Performed by: INTERNAL MEDICINE

## 2025-04-14 PROCEDURE — 1126F AMNT PAIN NOTED NONE PRSNT: CPT | Performed by: INTERNAL MEDICINE

## 2025-04-14 PROCEDURE — 1036F TOBACCO NON-USER: CPT | Performed by: INTERNAL MEDICINE

## 2025-04-14 ASSESSMENT — ENCOUNTER SYMPTOMS
SYNCOPE: 0
IRREGULAR HEARTBEAT: 0
COUGH: 0
WEIGHT LOSS: 0
DEPRESSION: 1
OCCASIONAL FEELINGS OF UNSTEADINESS: 0
DYSPNEA ON EXERTION: 0
DIZZINESS: 0
FEVER: 0
CLAUDICATION: 0
NEAR-SYNCOPE: 0
WHEEZING: 0
PALPITATIONS: 0
DIAPHORESIS: 0
LOSS OF SENSATION IN FEET: 0
SHORTNESS OF BREATH: 0
ORTHOPNEA: 0
WEAKNESS: 0
MYALGIAS: 0
WEIGHT GAIN: 0
PND: 0

## 2025-04-14 ASSESSMENT — PAIN SCALES - GENERAL: PAINLEVEL_OUTOF10: 0-NO PAIN

## 2025-04-14 ASSESSMENT — LIFESTYLE VARIABLES: TOTAL SCORE: 0

## 2025-04-14 NOTE — PROGRESS NOTES
Subjective      Chief Complaint   Patient presents with    previous dr perez patient        84-year-old female presents for cardiac evaluation.  She is a former patient of Dr. Perez.  She had a mitral and tricuspid valve repair with annuloplasty rings in .  Coronary angiography at that time was normal. She is very active, farms. She has no limitations.         Review of Systems   Constitutional: Negative for diaphoresis, fever, weight gain and weight loss.   Eyes:  Negative for visual disturbance.   Cardiovascular:  Negative for chest pain, claudication, dyspnea on exertion, irregular heartbeat, leg swelling, near-syncope, orthopnea, palpitations, paroxysmal nocturnal dyspnea and syncope.   Respiratory:  Negative for cough, shortness of breath and wheezing.    Musculoskeletal:  Negative for muscle weakness and myalgias.   Neurological:  Negative for dizziness and weakness.   All other systems reviewed and are negative.       Past Medical History:   Diagnosis Date    Atrial fibrillation (Multi)     CAD (coronary artery disease)     CHF (congestive heart failure)     Disappearance and death of family member 2013    Bereavement, uncomplicated    Heart disease     Right knee pain         Past Surgical History:   Procedure Laterality Date    AORTIC VALVE REPLACEMENT      KNEE SURGERY  2013    Knee Surgery left    TONSILLECTOMY  2013    Tonsillectomy    TRICUSPID VALVE REPLACEMENT          Social History     Socioeconomic History    Marital status:      Spouse name: Not on file    Number of children: Not on file    Years of education: Not on file    Highest education level: Not on file   Occupational History    Not on file   Tobacco Use    Smoking status: Former     Current packs/day: 0.00     Types: Cigarettes     Quit date: 1970     Years since quittin.3     Passive exposure: Never    Smokeless tobacco: Never   Vaping Use    Vaping status: Never Used   Substance and Sexual  Activity    Alcohol use: Yes     Comment: maybe 3 drinks a week    Drug use: Never    Sexual activity: Defer   Other Topics Concern    Not on file   Social History Narrative    Not on file     Social Drivers of Health     Financial Resource Strain: Not on file   Food Insecurity: Not on file   Transportation Needs: Not on file   Physical Activity: Not on file   Stress: Not on file   Social Connections: Not on file   Intimate Partner Violence: Not on file   Housing Stability: Not on file        Family History   Problem Relation Name Age of Onset    Cancer Mother      Stroke Father          OBJECTIVE:    Vitals:    04/14/25 1501   BP: 118/78   Pulse: 106   SpO2: 93%        Vitals reviewed.   Constitutional:       Appearance: Normal and healthy appearance. Not in distress.   Pulmonary:      Effort: Pulmonary effort is normal.      Breath sounds: Normal breath sounds.   Cardiovascular:      Normal rate. Regular rhythm. Normal S1. Normal S2.       Murmurs: There is no murmur.      No gallop.  No click.   Pulses:     Intact distal pulses.   Edema:     Peripheral edema absent.   Skin:     General: Skin is warm and dry.   Neurological:      General: No focal deficit present.          Lab Review:   Lab Results   Component Value Date     02/26/2025    K 4.4 02/26/2025     02/26/2025    CO2 29 02/26/2025    BUN 20 02/26/2025    CREATININE 1.16 (H) 02/26/2025    GLUCOSE 97 02/26/2025    CALCIUM 9.2 02/26/2025     Lab Results   Component Value Date    CHOL 152 02/26/2025    TRIG 80 02/26/2025    HDL 79 02/26/2025       Lab Results   Component Value Date    LDLCALC 57 02/26/2025        Benign essential hypertension  Controlled. Continue metoprolol,lasix for now    Chronic atrial fibrillation (Multi)  Will continue with rate control. Will continue Eliquis for stroke risk reduction.

## 2025-04-19 DIAGNOSIS — E78.00 PURE HYPERCHOLESTEROLEMIA: ICD-10-CM

## 2025-05-19 ENCOUNTER — APPOINTMENT (OUTPATIENT)
Dept: PRIMARY CARE | Facility: CLINIC | Age: 85
End: 2025-05-19
Payer: MEDICARE

## 2025-06-03 ENCOUNTER — OFFICE VISIT (OUTPATIENT)
Dept: PRIMARY CARE | Facility: CLINIC | Age: 85
End: 2025-06-03
Payer: MEDICARE

## 2025-06-03 VITALS
HEIGHT: 62 IN | OXYGEN SATURATION: 95 % | WEIGHT: 136 LBS | DIASTOLIC BLOOD PRESSURE: 72 MMHG | HEART RATE: 68 BPM | BODY MASS INDEX: 25.03 KG/M2 | SYSTOLIC BLOOD PRESSURE: 120 MMHG

## 2025-06-03 DIAGNOSIS — J96.11 CHRONIC RESPIRATORY FAILURE WITH HYPOXIA: ICD-10-CM

## 2025-06-03 DIAGNOSIS — R53.82 CHRONIC FATIGUE: ICD-10-CM

## 2025-06-03 DIAGNOSIS — D75.89 MACROCYTOSIS: ICD-10-CM

## 2025-06-03 DIAGNOSIS — I48.20 CHRONIC ATRIAL FIBRILLATION (MULTI): Primary | ICD-10-CM

## 2025-06-03 DIAGNOSIS — I50.9 CONGESTIVE HEART FAILURE, UNSPECIFIED HF CHRONICITY, UNSPECIFIED HEART FAILURE TYPE: ICD-10-CM

## 2025-06-03 DIAGNOSIS — F41.1 GENERALIZED ANXIETY DISORDER: ICD-10-CM

## 2025-06-03 DIAGNOSIS — I48.11 LONGSTANDING PERSISTENT ATRIAL FIBRILLATION (MULTI): ICD-10-CM

## 2025-06-03 PROCEDURE — 1036F TOBACCO NON-USER: CPT | Performed by: STUDENT IN AN ORGANIZED HEALTH CARE EDUCATION/TRAINING PROGRAM

## 2025-06-03 PROCEDURE — 99214 OFFICE O/P EST MOD 30 MIN: CPT | Performed by: STUDENT IN AN ORGANIZED HEALTH CARE EDUCATION/TRAINING PROGRAM

## 2025-06-03 PROCEDURE — 1159F MED LIST DOCD IN RCRD: CPT | Performed by: STUDENT IN AN ORGANIZED HEALTH CARE EDUCATION/TRAINING PROGRAM

## 2025-06-03 PROCEDURE — 3078F DIAST BP <80 MM HG: CPT | Performed by: STUDENT IN AN ORGANIZED HEALTH CARE EDUCATION/TRAINING PROGRAM

## 2025-06-03 PROCEDURE — 3074F SYST BP LT 130 MM HG: CPT | Performed by: STUDENT IN AN ORGANIZED HEALTH CARE EDUCATION/TRAINING PROGRAM

## 2025-06-03 PROCEDURE — 1126F AMNT PAIN NOTED NONE PRSNT: CPT | Performed by: STUDENT IN AN ORGANIZED HEALTH CARE EDUCATION/TRAINING PROGRAM

## 2025-06-03 PROCEDURE — 93000 ELECTROCARDIOGRAM COMPLETE: CPT | Performed by: STUDENT IN AN ORGANIZED HEALTH CARE EDUCATION/TRAINING PROGRAM

## 2025-06-03 RX ORDER — ESCITALOPRAM OXALATE 10 MG/1
10 TABLET ORAL DAILY
Qty: 90 TABLET | Refills: 1 | Status: SHIPPED | OUTPATIENT
Start: 2025-06-03 | End: 2025-11-30

## 2025-06-03 ASSESSMENT — ENCOUNTER SYMPTOMS
SLEEP DISTURBANCE: 1
ARTHRALGIAS: 0
COUGH: 0
WOUND: 0
POLYDIPSIA: 0
VOMITING: 0
SHORTNESS OF BREATH: 1
DIZZINESS: 0
NAUSEA: 0
PALPITATIONS: 1
SINUS PRESSURE: 0
RHINORRHEA: 0
MYALGIAS: 0
DYSPHORIC MOOD: 0
WHEEZING: 0
DIARRHEA: 0
NERVOUS/ANXIOUS: 0
HEADACHES: 0
DYSURIA: 0
FATIGUE: 1
APPETITE CHANGE: 1
CHILLS: 0
FEVER: 0
LIGHT-HEADEDNESS: 1
SINUS PAIN: 0
CONSTIPATION: 0
FREQUENCY: 0

## 2025-06-03 ASSESSMENT — PATIENT HEALTH QUESTIONNAIRE - PHQ9
7. TROUBLE CONCENTRATING ON THINGS, SUCH AS READING THE NEWSPAPER OR WATCHING TELEVISION: MORE THAN HALF THE DAYS
3. TROUBLE FALLING OR STAYING ASLEEP OR SLEEPING TOO MUCH: MORE THAN HALF THE DAYS
1. LITTLE INTEREST OR PLEASURE IN DOING THINGS: SEVERAL DAYS
2. FEELING DOWN, DEPRESSED OR HOPELESS: MORE THAN HALF THE DAYS
SUM OF ALL RESPONSES TO PHQ QUESTIONS 1-9: 14
8. MOVING OR SPEAKING SO SLOWLY THAT OTHER PEOPLE COULD HAVE NOTICED. OR THE OPPOSITE, BEING SO FIGETY OR RESTLESS THAT YOU HAVE BEEN MOVING AROUND A LOT MORE THAN USUAL: MORE THAN HALF THE DAYS
SUM OF ALL RESPONSES TO PHQ9 QUESTIONS 1 AND 2: 3
5. POOR APPETITE OR OVEREATING: MORE THAN HALF THE DAYS
4. FEELING TIRED OR HAVING LITTLE ENERGY: MORE THAN HALF THE DAYS
9. THOUGHTS THAT YOU WOULD BE BETTER OFF DEAD, OR OF HURTING YOURSELF: SEVERAL DAYS
6. FEELING BAD ABOUT YOURSELF - OR THAT YOU ARE A FAILURE OR HAVE LET YOURSELF OR YOUR FAMILY DOWN: NOT AT ALL

## 2025-06-03 ASSESSMENT — COLUMBIA-SUICIDE SEVERITY RATING SCALE - C-SSRS
6. HAVE YOU EVER DONE ANYTHING, STARTED TO DO ANYTHING, OR PREPARED TO DO ANYTHING TO END YOUR LIFE?: NO
1. IN THE PAST MONTH, HAVE YOU WISHED YOU WERE DEAD OR WISHED YOU COULD GO TO SLEEP AND NOT WAKE UP?: NO
2. HAVE YOU ACTUALLY HAD ANY THOUGHTS OF KILLING YOURSELF?: NO

## 2025-06-03 ASSESSMENT — ANXIETY QUESTIONNAIRES
6. BECOMING EASILY ANNOYED OR IRRITABLE: NEARLY EVERY DAY
IF YOU CHECKED OFF ANY PROBLEMS ON THIS QUESTIONNAIRE, HOW DIFFICULT HAVE THESE PROBLEMS MADE IT FOR YOU TO DO YOUR WORK, TAKE CARE OF THINGS AT HOME, OR GET ALONG WITH OTHER PEOPLE: VERY DIFFICULT
1. FEELING NERVOUS, ANXIOUS, OR ON EDGE: SEVERAL DAYS
GAD7 TOTAL SCORE: 11
2. NOT BEING ABLE TO STOP OR CONTROL WORRYING: NOT AT ALL
3. WORRYING TOO MUCH ABOUT DIFFERENT THINGS: NOT AT ALL
7. FEELING AFRAID AS IF SOMETHING AWFUL MIGHT HAPPEN: MORE THAN HALF THE DAYS
5. BEING SO RESTLESS THAT IT IS HARD TO SIT STILL: MORE THAN HALF THE DAYS
4. TROUBLE RELAXING: NEARLY EVERY DAY

## 2025-06-03 ASSESSMENT — PAIN SCALES - GENERAL: PAINLEVEL_OUTOF10: 0-NO PAIN

## 2025-06-03 NOTE — ASSESSMENT & PLAN NOTE
Orders:    Tsh With Reflex To Free T4 If Abnormal; Future  possible thyroid disease would explain new onset fatigue.

## 2025-06-03 NOTE — ASSESSMENT & PLAN NOTE
Orders:    Transthoracic Echo (TTE) Complete; Future  patient advised she is overdue for repeat echocardiogram.  If her EF is decreasing, this could explain her current symptoms and would suggest need for change in current  therapy.

## 2025-06-03 NOTE — ASSESSMENT & PLAN NOTE
Orders:    ECG 12 lead (Clinic Performed)  compared EKG in office today to EKG from cardiology visit in November 2024.  There does not appear to be significant change per my interpretation.

## 2025-06-03 NOTE — ASSESSMENT & PLAN NOTE
Orders:    empagliflozin (Jardiance) 10 mg tablet; Take 1 tablet (10 mg) by mouth once daily.    Transthoracic Echo (TTE) Complete; Future

## 2025-06-03 NOTE — PROGRESS NOTES
"Subjective   Patient ID: Vilma Brenabe is a 84 y.o. female who presents for Follow-up (Patient is in office today for being very low on energy for the past few weeks . She states her balance is off and she has to hold on things to keep her balance . She states her apatite is almost gone . ).    Last couple of weeks has been feeling more fatigued.  Has been having to take breaks going up the stairs.  Appetite is decreased.  Sleeping poorly and also sleeping more.      Has had decreased interest in regular activities.  Had previously been a very busy person and this has abruptly stopped.        Review of Systems   Constitutional:  Positive for appetite change and fatigue. Negative for chills and fever.   HENT:  Negative for congestion, hearing loss, rhinorrhea, sinus pressure, sinus pain and tinnitus.    Eyes:  Negative for visual disturbance.   Respiratory:  Positive for shortness of breath. Negative for cough and wheezing.    Cardiovascular:  Positive for palpitations. Negative for chest pain and leg swelling.   Gastrointestinal:  Negative for constipation, diarrhea, nausea and vomiting.   Endocrine: Negative for cold intolerance, heat intolerance, polydipsia and polyuria.   Genitourinary:  Negative for dysuria, frequency, menstrual problem, urgency and vaginal discharge.   Musculoskeletal:  Negative for arthralgias and myalgias.   Skin:  Negative for pallor, rash and wound.   Neurological:  Positive for light-headedness. Negative for dizziness and headaches.   Psychiatric/Behavioral:  Positive for sleep disturbance. Negative for dysphoric mood. The patient is not nervous/anxious.        Objective     Visit Vitals  /72   Pulse 68   Ht 1.575 m (5' 2\")   Wt 61.7 kg (136 lb)   LMP  (LMP Unknown)   SpO2 95%   BMI 24.87 kg/m²   OB Status Postmenopausal   Smoking Status Former   BSA 1.64 m²         Physical Exam    Assessment & Plan  Chronic atrial fibrillation (Multi)    Orders:    ECG 12 lead (Clinic " Performed)  compared EKG in office today to EKG from cardiology visit in November 2024.  There does not appear to be significant change per my interpretation.    Congestive heart failure, unspecified HF chronicity, unspecified heart failure type    Orders:    empagliflozin (Jardiance) 10 mg tablet; Take 1 tablet (10 mg) by mouth once daily.    Transthoracic Echo (TTE) Complete; Future    Chronic fatigue    Orders:    Tsh With Reflex To Free T4 If Abnormal; Future  possible thyroid disease would explain new onset fatigue.   Macrocytosis    Orders:    Vitamin B12; Future    Folate; Future  macrocytosis caused by folate or b12 deficiency could explain some degree of fatigue or dyspnea.   Longstanding persistent atrial fibrillation (Multi)    Orders:    Transthoracic Echo (TTE) Complete; Future  patient advised she is overdue for repeat echocardiogram.  If her EF is decreasing, this could explain her current symptoms and would suggest need for change in current  therapy.   Generalized anxiety disorder    Orders:    escitalopram (Lexapro) 10 mg tablet; Take 1 tablet (10 mg) by mouth once daily.  has been on fluoxetine for approximately 50 years.  Would like to try alternatives.  Wondering if there are less drowsy options available.  Will trial lexapro.   Chronic respiratory failure with hypoxia       reports minimal improvement with Trelegy.  Was previously prescribed oxygen therapy, but does not use it.  Might explain current dyspnea.  Could try Breztri to see if BID dosing improves her symptoms if no other causes are identified.         Reviewed and approved by EBONI KIM on 6/3/25 at 8:06 PM.        Yes

## 2025-06-03 NOTE — ASSESSMENT & PLAN NOTE
Orders:    escitalopram (Lexapro) 10 mg tablet; Take 1 tablet (10 mg) by mouth once daily.  has been on fluoxetine for approximately 50 years.  Would like to try alternatives.  Wondering if there are less drowsy options available.  Will trial lexapro.

## 2025-06-04 LAB
FOLATE SERPL-MCNC: 13.1 NG/ML
TSH SERPL-ACNC: 1.99 MIU/L (ref 0.4–4.5)
VIT B12 SERPL-MCNC: 246 PG/ML (ref 200–1100)

## 2025-06-04 NOTE — ASSESSMENT & PLAN NOTE
reports minimal improvement with Trelegy.  Was previously prescribed oxygen therapy, but does not use it.  Might explain current dyspnea.  Could try Breztri to see if BID dosing improves her symptoms if no other causes are identified.

## 2025-06-05 ENCOUNTER — TELEPHONE (OUTPATIENT)
Dept: PRIMARY CARE | Facility: CLINIC | Age: 85
End: 2025-06-05
Payer: MEDICARE

## 2025-06-05 NOTE — TELEPHONE ENCOUNTER
Patient would like the results of her Vitamin B12 and Thyroid labs    Patient can be reached at [phone]

## 2025-06-06 NOTE — TELEPHONE ENCOUNTER
Attempted to call patient but phone kept ringing no option to leave a voicemail . Will try again later this afternoon.

## 2025-06-06 NOTE — TELEPHONE ENCOUNTER
Thyroid was normal.  Folate was normal.  B12 was mildly low.  It is unclear if this would be the cause for her symptoms, however it would be reasonable to try B12 replacement to see if it helps her.  We could do this as a pill that could be prescribed or purchased over-the-counter, or she could come to the office for injections.  One is not necessarily better than the other unless you have digestive issues in which case the injection is better.

## 2025-06-11 ENCOUNTER — TELEPHONE (OUTPATIENT)
Dept: PRIMARY CARE | Facility: CLINIC | Age: 85
End: 2025-06-11
Payer: MEDICARE

## 2025-06-11 DIAGNOSIS — E53.8 B12 DEFICIENCY: Primary | ICD-10-CM

## 2025-06-12 DIAGNOSIS — F41.9 ANXIETY DISORDER, UNSPECIFIED TYPE: ICD-10-CM

## 2025-06-12 RX ORDER — FLUOXETINE 20 MG/1
20 CAPSULE ORAL
Qty: 90 CAPSULE | Refills: 3 | OUTPATIENT
Start: 2025-06-12

## 2025-06-16 RX ORDER — CYANOCOBALAMIN 1000 UG/ML
1000 INJECTION, SOLUTION INTRAMUSCULAR; SUBCUTANEOUS ONCE
Status: SHIPPED | OUTPATIENT
Start: 2025-07-01

## 2025-07-01 ENCOUNTER — TELEPHONE (OUTPATIENT)
Dept: PRIMARY CARE | Facility: CLINIC | Age: 85
End: 2025-07-01

## 2025-07-01 ENCOUNTER — APPOINTMENT (OUTPATIENT)
Dept: PRIMARY CARE | Facility: CLINIC | Age: 85
End: 2025-07-01
Payer: MEDICARE

## 2025-07-01 VITALS
OXYGEN SATURATION: 98 % | BODY MASS INDEX: 24.29 KG/M2 | WEIGHT: 132 LBS | SYSTOLIC BLOOD PRESSURE: 110 MMHG | HEIGHT: 62 IN | HEART RATE: 104 BPM | DIASTOLIC BLOOD PRESSURE: 80 MMHG

## 2025-07-01 DIAGNOSIS — F41.9 ANXIETY DISORDER, UNSPECIFIED TYPE: ICD-10-CM

## 2025-07-01 DIAGNOSIS — L82.0 SEBORRHEIC KERATOSES, INFLAMED: Primary | ICD-10-CM

## 2025-07-01 PROCEDURE — 1126F AMNT PAIN NOTED NONE PRSNT: CPT | Performed by: STUDENT IN AN ORGANIZED HEALTH CARE EDUCATION/TRAINING PROGRAM

## 2025-07-01 PROCEDURE — 96372 THER/PROPH/DIAG INJ SC/IM: CPT | Performed by: STUDENT IN AN ORGANIZED HEALTH CARE EDUCATION/TRAINING PROGRAM

## 2025-07-01 PROCEDURE — 1036F TOBACCO NON-USER: CPT | Performed by: STUDENT IN AN ORGANIZED HEALTH CARE EDUCATION/TRAINING PROGRAM

## 2025-07-01 PROCEDURE — 3074F SYST BP LT 130 MM HG: CPT | Performed by: STUDENT IN AN ORGANIZED HEALTH CARE EDUCATION/TRAINING PROGRAM

## 2025-07-01 PROCEDURE — 99213 OFFICE O/P EST LOW 20 MIN: CPT | Performed by: STUDENT IN AN ORGANIZED HEALTH CARE EDUCATION/TRAINING PROGRAM

## 2025-07-01 PROCEDURE — 3079F DIAST BP 80-89 MM HG: CPT | Performed by: STUDENT IN AN ORGANIZED HEALTH CARE EDUCATION/TRAINING PROGRAM

## 2025-07-01 PROCEDURE — 1159F MED LIST DOCD IN RCRD: CPT | Performed by: STUDENT IN AN ORGANIZED HEALTH CARE EDUCATION/TRAINING PROGRAM

## 2025-07-01 RX ADMIN — CYANOCOBALAMIN 1000 MCG: 1000 INJECTION, SOLUTION INTRAMUSCULAR; SUBCUTANEOUS at 12:57

## 2025-07-01 ASSESSMENT — PATIENT HEALTH QUESTIONNAIRE - PHQ9
2. FEELING DOWN, DEPRESSED OR HOPELESS: SEVERAL DAYS
SUM OF ALL RESPONSES TO PHQ9 QUESTIONS 1 AND 2: 2
6. FEELING BAD ABOUT YOURSELF - OR THAT YOU ARE A FAILURE OR HAVE LET YOURSELF OR YOUR FAMILY DOWN: SEVERAL DAYS
9. THOUGHTS THAT YOU WOULD BE BETTER OFF DEAD, OR OF HURTING YOURSELF: NOT AT ALL
3. TROUBLE FALLING OR STAYING ASLEEP OR SLEEPING TOO MUCH: SEVERAL DAYS
1. LITTLE INTEREST OR PLEASURE IN DOING THINGS: SEVERAL DAYS
8. MOVING OR SPEAKING SO SLOWLY THAT OTHER PEOPLE COULD HAVE NOTICED. OR THE OPPOSITE, BEING SO FIGETY OR RESTLESS THAT YOU HAVE BEEN MOVING AROUND A LOT MORE THAN USUAL: NOT AT ALL
5. POOR APPETITE OR OVEREATING: NOT AT ALL
4. FEELING TIRED OR HAVING LITTLE ENERGY: NOT AT ALL
SUM OF ALL RESPONSES TO PHQ QUESTIONS 1-9: 4
7. TROUBLE CONCENTRATING ON THINGS, SUCH AS READING THE NEWSPAPER OR WATCHING TELEVISION: NOT AT ALL

## 2025-07-01 ASSESSMENT — ANXIETY QUESTIONNAIRES
2. NOT BEING ABLE TO STOP OR CONTROL WORRYING: SEVERAL DAYS
4. TROUBLE RELAXING: MORE THAN HALF THE DAYS
7. FEELING AFRAID AS IF SOMETHING AWFUL MIGHT HAPPEN: SEVERAL DAYS
3. WORRYING TOO MUCH ABOUT DIFFERENT THINGS: SEVERAL DAYS
GAD7 TOTAL SCORE: 10
1. FEELING NERVOUS, ANXIOUS, OR ON EDGE: SEVERAL DAYS
6. BECOMING EASILY ANNOYED OR IRRITABLE: MORE THAN HALF THE DAYS
IF YOU CHECKED OFF ANY PROBLEMS ON THIS QUESTIONNAIRE, HOW DIFFICULT HAVE THESE PROBLEMS MADE IT FOR YOU TO DO YOUR WORK, TAKE CARE OF THINGS AT HOME, OR GET ALONG WITH OTHER PEOPLE: SOMEWHAT DIFFICULT
5. BEING SO RESTLESS THAT IT IS HARD TO SIT STILL: MORE THAN HALF THE DAYS

## 2025-07-01 ASSESSMENT — PAIN SCALES - GENERAL: PAINLEVEL_OUTOF10: 0-NO PAIN

## 2025-07-01 NOTE — PROGRESS NOTES
"Subjective   Patient ID: Vilma Bernabe is a 84 y.o. female who presents for Skin Tag and Anxiety.  Here today to follow up regarding anxiety.  States that she is doing well with Lexapro.  Has not noticed side effects.  Still anxious, in large part regarding finances.          Review of Systems    Objective     Visit Vitals  /80   Pulse 104   Ht 1.575 m (5' 2\")   Wt 59.9 kg (132 lb)   LMP  (LMP Unknown)   SpO2 98%   BMI 24.14 kg/m²   OB Status Postmenopausal   Smoking Status Former   BSA 1.62 m²   RGACIE-7 Total Score: 10 (7/1/2025  1:07 PM)  Patient Health Questionnaire-9 Score: 4        Physical Exam  Skin Tag Removal    Date/Time: 7/1/2025 9:06 PM    Performed by: Jan Nye DO  Authorized by: Jan yNe DO    Consent:     Consent obtained:  Written    Consent given by:  Patient    Risks, benefits, and alternatives were discussed: yes      Risks discussed:  Bleeding, infection and poor cosmetic result    Alternatives discussed:  Observation and referral  Universal protocol:     Procedure explained and questions answered to patient or proxy's satisfaction: yes      Immediately prior to procedure, a time out was called: yes      Patient identity confirmed:  Verbally with patient  Indications:     Indications:  Chronic lesions, itching.  Sedation:     Sedation type:  None  Anesthesia:     Anesthesia method:  None  Procedure specific details:      Discussed options for treatment.  Patient would like to move forward with cryotherapy.    Post-procedure details:     Procedure completion:  Tolerated well, no immediate complications      Assessment & Plan  Seborrheic keratoses, inflamed    Orders:    Skin Tag Removal    Anxiety disorder, unspecified type       continue Lexapro.  Discussed possibility of increasing to 20 mg.  Patient states she feels as though current dose adequately controls her anxiety disorder symptoms.  States current anxiety is related to real world, rational concerns.        Reviewed and " approved by EBONI KIM on 7/1/25 at 9:10 PM.

## 2025-07-02 NOTE — ASSESSMENT & PLAN NOTE
continue Lexapro.  Discussed possibility of increasing to 20 mg.  Patient states she feels as though current dose adequately controls her anxiety disorder symptoms.  States current anxiety is related to real world, rational concerns.

## 2025-07-08 ENCOUNTER — APPOINTMENT (OUTPATIENT)
Dept: PRIMARY CARE | Facility: CLINIC | Age: 85
End: 2025-07-08
Payer: MEDICARE

## 2025-07-09 ENCOUNTER — APPOINTMENT (OUTPATIENT)
Dept: PRIMARY CARE | Facility: CLINIC | Age: 85
End: 2025-07-09
Payer: MEDICARE

## 2025-07-14 ENCOUNTER — APPOINTMENT (OUTPATIENT)
Dept: PRIMARY CARE | Facility: CLINIC | Age: 85
End: 2025-07-14
Payer: MEDICARE

## 2025-07-29 ENCOUNTER — APPOINTMENT (OUTPATIENT)
Age: 85
End: 2025-07-29
Payer: MEDICARE

## 2025-07-29 VITALS
DIASTOLIC BLOOD PRESSURE: 70 MMHG | BODY MASS INDEX: 24.66 KG/M2 | WEIGHT: 134 LBS | HEART RATE: 72 BPM | SYSTOLIC BLOOD PRESSURE: 126 MMHG | OXYGEN SATURATION: 94 % | HEIGHT: 62 IN

## 2025-07-29 DIAGNOSIS — I48.11 LONGSTANDING PERSISTENT ATRIAL FIBRILLATION (MULTI): Primary | ICD-10-CM

## 2025-07-29 DIAGNOSIS — I42.0 DILATED CARDIOMYOPATHY (MULTI): ICD-10-CM

## 2025-07-29 DIAGNOSIS — Z98.890 HISTORY OF TRICUSPID VALVE REPAIR: ICD-10-CM

## 2025-07-29 PROCEDURE — 1159F MED LIST DOCD IN RCRD: CPT | Performed by: INTERNAL MEDICINE

## 2025-07-29 PROCEDURE — 1126F AMNT PAIN NOTED NONE PRSNT: CPT | Performed by: INTERNAL MEDICINE

## 2025-07-29 PROCEDURE — 3078F DIAST BP <80 MM HG: CPT | Performed by: INTERNAL MEDICINE

## 2025-07-29 PROCEDURE — 99214 OFFICE O/P EST MOD 30 MIN: CPT | Performed by: INTERNAL MEDICINE

## 2025-07-29 PROCEDURE — 3074F SYST BP LT 130 MM HG: CPT | Performed by: INTERNAL MEDICINE

## 2025-07-29 RX ORDER — BUPROPION HYDROCHLORIDE 150 MG/1
150 TABLET ORAL DAILY
COMMUNITY

## 2025-07-29 ASSESSMENT — LIFESTYLE VARIABLES
AUDIT-C TOTAL SCORE: 3
HOW OFTEN DURING THE LAST YEAR HAVE YOU FAILED TO DO WHAT WAS NORMALLY EXPECTED FROM YOU BECAUSE OF DRINKING: NEVER
HOW OFTEN DURING THE LAST YEAR HAVE YOU NEEDED AN ALCOHOLIC DRINK FIRST THING IN THE MORNING TO GET YOURSELF GOING AFTER A NIGHT OF HEAVY DRINKING: NEVER
HOW OFTEN DURING THE LAST YEAR HAVE YOU BEEN UNABLE TO REMEMBER WHAT HAPPENED THE NIGHT BEFORE BECAUSE YOU HAD BEEN DRINKING: NEVER
HOW OFTEN DO YOU HAVE SIX OR MORE DRINKS ON ONE OCCASION: NEVER
SKIP TO QUESTIONS 9-10: 1
HOW MANY STANDARD DRINKS CONTAINING ALCOHOL DO YOU HAVE ON A TYPICAL DAY: 1 OR 2
HOW OFTEN DURING THE LAST YEAR HAVE YOU HAD A FEELING OF GUILT OR REMORSE AFTER DRINKING: NEVER
HOW OFTEN DURING THE LAST YEAR HAVE YOU FOUND THAT YOU WERE NOT ABLE TO STOP DRINKING ONCE YOU HAD STARTED: NEVER
HAVE YOU OR SOMEONE ELSE BEEN INJURED AS A RESULT OF YOUR DRINKING: NO
HAS A RELATIVE, FRIEND, DOCTOR, OR ANOTHER HEALTH PROFESSIONAL EXPRESSED CONCERN ABOUT YOUR DRINKING OR SUGGESTED YOU CUT DOWN: NO
HOW OFTEN DO YOU HAVE A DRINK CONTAINING ALCOHOL: 2-3 TIMES A WEEK
AUDIT TOTAL SCORE: 3

## 2025-07-29 ASSESSMENT — PATIENT HEALTH QUESTIONNAIRE - PHQ9
1. LITTLE INTEREST OR PLEASURE IN DOING THINGS: NOT AT ALL
2. FEELING DOWN, DEPRESSED OR HOPELESS: NOT AT ALL
SUM OF ALL RESPONSES TO PHQ9 QUESTIONS 1 AND 2: 0

## 2025-07-29 ASSESSMENT — ENCOUNTER SYMPTOMS
OCCASIONAL FEELINGS OF UNSTEADINESS: 1
DEPRESSION: 0
LOSS OF SENSATION IN FEET: 0

## 2025-07-29 ASSESSMENT — PAIN SCALES - GENERAL: PAINLEVEL_OUTOF10: 0-NO PAIN

## 2025-07-29 NOTE — ASSESSMENT & PLAN NOTE
This is a nonischemic cardiomyopathy probably related to combination of valvular heart disease and hypertensive heart disease.

## 2025-07-29 NOTE — PROGRESS NOTES
[facility] [place]        Subjective   Chief Complaint   Patient presents with    Follow-up     Patient formerly saw Donovan Sorensen (once), then Catie (once). She presents to the office today to transfer care to you.       Here for reassessment of her cardiomyopathy and chronic atrial fibrillation, angina free with no signs of volume overload.         Previous: She does have a history of a mitral valve replacement with tricuspid repair in 2015 and atrial appendage was clipped. She does have chronic atrial fibrillation. She was on Coumadin for years and was switched to Eliquis. She was evaluated for a Watchman procedure but evidently does not qualify. She was placed on 2.5 Eliquis twice daily she said she had some blood in her stool and cut it back to once a day. Her last echocardiogram did show the EF was 40%. EKG today shows atrial fibrillation   She has a past medical history of Atrial fibrillation (Multi), CAD (coronary artery disease), CHF (congestive heart failure), Disappearance and death of family member (04/09/2013), Heart disease, and Right knee pain.  She has a past surgical history that includes Tonsillectomy (04/09/2013); Knee surgery (05/03/2013); Aortic valve replacement; and Tricuspid valve replacement.   Family medical history includes stroke in father.  Current Outpatient Medications   Medication Sig Dispense Refill    apixaban (Eliquis) 2.5 mg tablet Take 1 tablet (2.5 mg) by mouth 2 times a day. 60 tablet 11    atorvastatin (Lipitor) 40 mg tablet Take 1 tablet (40 mg) by mouth once daily. 90 tablet 3    buPROPion XL (Wellbutrin XL) 150 mg 24 hr tablet Take 1 tablet (150 mg) by mouth once daily. Do not crush, chew, or split.      empagliflozin (Jardiance) 10 mg tablet Take 1 tablet (10 mg) by mouth once daily. 90 tablet 3    escitalopram (Lexapro) 10 mg tablet Take 1 tablet (10 mg) by mouth once daily. 90 tablet 1    fluticasone-umeclidin-vilanter  "([place]ino Carrington) 100-62.5-25 mcg blister with device Inhale 1 puff early in the morning.. 90 each 1    furosemide (Lasix) 20 mg tablet Take 1 tablet (20 mg) by mouth once daily. 90 tablet 1    metoprolol succinate XL (Toprol-XL) 50 mg 24 hr tablet Take 1 tablet (50 mg) by mouth once daily. 90 tablet 3    acetaminophen (TylenoL) 325 mg tablet Take 1 tablet (325 mg) by mouth every 4 hours if needed. (Patient not taking: Reported on 7/29/2025)       No current facility-administered medications for this visit.      reports that she quit smoking about 55 years ago. Her smoking use included cigarettes. She has never been exposed to tobacco smoke. She has never used smokeless tobacco. She reports current alcohol use of about 3.0 standard drinks of alcohol per week. She reports that she does not use drugs.  Allergies:  Patient has no known allergies.    ROS: See HPI  CONSTITUTIONAL: Chills- none. Fever- none. Weight change appropriate for age.  HEENT: Headache- Negative.  Change in vision- none.  Ear pain- none. Nasal congestion- none. Post-nasal drip-none.  Sore throat-none.  CARDIOLOGY: Chest pain- none.  Leg edema-trace.  Murmurs-soft systolic.  Palpitation- none.  RESPIRATORY: Denies any shortness of breath.  GI: Abdominal pain- none.  Change in bowel habits- none.  Constipation- none.  Diarrhea- none.  Nausea- none.  Vomiting- none.  MUSCULOSKELETAL: Joint pain- none.  Muscle aches- none.  DERMATOLOGY: Rash- none.  NEUROLOGY: Dizziness- none.   Headache- none.  PSYCHIATRY: Denies any depression or anxiety     Vitals:    07/29/25 1440   BP: 126/70   Pulse: 72   SpO2: 94%   Weight: 60.8 kg (134 lb)   Height: 1.575 m (5' 2\")   PainSc: 0-No pain      BMI:Body mass index is 24.51 kg/m².   General Cardiology:  General Appearance: Alert, oriented and in no acute distress.  HEENT: extra ocular movements intact (EOMI), pupils equal,  round, reactive to light and accommodation (PERRLA).  Carotid Upstroke: no bruit, " "normal.  Jugular Venous Distention (JVD): flat.  Chest: normal.  Lungs: Clear to auscultation,   Heart Sounds: no S3 or S4, normal S1, S2, regular rate.  Murmur, Click, Gallop: soft systolic murmur.  Abdomen: no hepatomegaly, no masses felt, soft.  Extremities: no leg edema.  Peripheral pulses: 2 plus bilateral.  NEUROLOGY Cranial nerves II-XII grossly intact.     Last Labs:  CMP:  Recent Labs     02/26/25  1101 12/10/24  1135 11/09/23  1134 03/23/23  1215     --  142 140   K 4.4  --  4.5 4.3     --  102 103   CO2 29  --  29 24   ANIONGAP 8  --  11 13   BUN 20  --  17 22   CREATININE 1.16* 0.99 1.20 1.2   EGFR 46* 56* 45* 45   GLUCOSE 97  --  98 93     Recent Labs     02/26/25  1101 03/23/23  1215 05/12/22  1617 05/11/22  1403   ALBUMIN 4.3 4.4  --  4.0   ALKPHOS 104 78  --  96   ALT 14 20  --  34   AST 21 27  --  51*   BILITOT 1.0 1.0 3.4* 1.8*     CBC:  Recent Labs     02/26/25  1101 03/23/23  1215 05/17/22  0413   WBC 5.5 6.4 6.2   HGB 13.0 14.3 9.8*   HCT 39.1 45.0* 32.2*    243 265   .5* 110.0* 109.5*     COAG:   Recent Labs     07/10/23  1231 07/03/23  1058   INR 1.3* 1.2*     HEME/ENDO:  Recent Labs     06/03/25  1539 05/13/22  0508 09/21/21  1152 08/10/21  0403   FERRITIN  --   --   --  551*   IRONSAT  --   --   --  19.9   TSH 1.99 2.16 1.37  --       CARDIAC: No results for input(s): \"LDH\", \"CKMB\", \"TROPHS\", \"BNP\" in the last 24670 hours.    No lab exists for component: \"CK\", \"CKMBP\"  Recent Labs     02/26/25  1101 03/23/23  1215 05/12/22  0401 01/24/22  1309 09/21/21  1152 08/10/21  0403 12/07/20  1156   CHOL 152 182 85* 114 153   < > 183   LDLF  --   --   --  45 62  --  100*   LDLCALC 57 97 18*  --   --    < >  --    HDL 79 55 58 45.4 57.6   < > 56.3   TRIG 80 152* 44 119 168*   < > 132    < > = values in this interval not displayed.       Last Cardiology Tests:  Echo:  Echo Results:  No results found for this or any previous visit from the past 3650 days.     Cath:  Stress " Test:  Stress Results:  No results found for this or any previous visit from the past 365 days.     Cardiac Imaging:    Problem List Items Addressed This Visit       History of tricuspid valve repair    Stable with functioning tricuspid valve repair    Stable with a functioning mitral valve repair         Cardiomyopathy    This is a nonischemic cardiomyopathy probably related to combination of valvular heart disease and hypertensive heart disease.         Longstanding persistent atrial fibrillation (Multi) - Primary    On rate control with anticoagulation and doing well           Would suggest echocardiography in 9 months and follow-up with me to review those results.        Pt. care time is spent includes review of diagnostic tests, labs, radiographs, EKGs, old echoes, cardiac work-up and coordination of care. Assessment, impression and plans are reflected in the note above as well as the orders.    Preet Membreno, DO  [facility] [place]  [facility]

## 2025-09-03 ENCOUNTER — TELEPHONE (OUTPATIENT)
Dept: PRIMARY CARE | Facility: CLINIC | Age: 85
End: 2025-09-03
Payer: MEDICARE

## 2025-09-03 DIAGNOSIS — I10 HYPERTENSION, UNSPECIFIED TYPE: ICD-10-CM

## 2025-09-03 DIAGNOSIS — E78.00 PURE HYPERCHOLESTEROLEMIA: ICD-10-CM

## 2025-10-02 ENCOUNTER — APPOINTMENT (OUTPATIENT)
Dept: PRIMARY CARE | Facility: CLINIC | Age: 85
End: 2025-10-02
Payer: MEDICARE